# Patient Record
Sex: FEMALE | Race: WHITE | NOT HISPANIC OR LATINO | Employment: PART TIME | URBAN - METROPOLITAN AREA
[De-identification: names, ages, dates, MRNs, and addresses within clinical notes are randomized per-mention and may not be internally consistent; named-entity substitution may affect disease eponyms.]

---

## 2023-07-14 LAB — EXTERNAL RHOGAM GIVEN?: YES

## 2023-08-28 ENCOUNTER — TELEPHONE (OUTPATIENT)
Dept: OBGYN CLINIC | Facility: MEDICAL CENTER | Age: 28
End: 2023-08-28

## 2023-09-07 ENCOUNTER — ROUTINE PRENATAL (OUTPATIENT)
Dept: OBGYN CLINIC | Facility: CLINIC | Age: 28
End: 2023-09-07

## 2023-09-07 VITALS — BODY MASS INDEX: 26.85 KG/M2 | DIASTOLIC BLOOD PRESSURE: 60 MMHG | WEIGHT: 151.6 LBS | SYSTOLIC BLOOD PRESSURE: 110 MMHG

## 2023-09-07 DIAGNOSIS — Z34.03 ENCOUNTER FOR SUPERVISION OF NORMAL FIRST PREGNANCY IN THIRD TRIMESTER: Primary | ICD-10-CM

## 2023-09-07 DIAGNOSIS — Z3A.36 36 WEEKS GESTATION OF PREGNANCY: ICD-10-CM

## 2023-09-07 PROCEDURE — 87150 DNA/RNA AMPLIFIED PROBE: CPT | Performed by: OBSTETRICS & GYNECOLOGY

## 2023-09-07 PROCEDURE — PNV: Performed by: OBSTETRICS & GYNECOLOGY

## 2023-09-07 NOTE — PATIENT INSTRUCTIONS
Fetal Movement   WHAT YOU NEED TO KNOW:   Fetal movements are the kicks, rolls, and hiccups of your unborn baby. You may start to feel these movements when you are 20 weeks pregnant. The movements grow stronger and more frequent as your baby grows. Fetal movements show that your unborn baby is getting the oxygen and nutrients he or she needs before birth. Fewer fetal movements may signal a problem with your baby's health. DISCHARGE INSTRUCTIONS:   Follow up with your doctor or obstetrician as directed:  Write down your questions so you remember to ask them during your visits. Normal fetal movement:  Fetal activity can be described by 4 states, from least to most active. During quiet sleep, your unborn baby may be still for up to 2 hours. During active sleep, he or she kicks, rolls, and moves often. During the quiet awake state, he or she may only move his or her eyes. The active awake state includes strong kicks and rolls. What affects fetal movement:  You may feel your baby move more after you eat, or after you drink caffeine. You may feel your baby move less while you are more active, such as when you exercise. You may also feel fewer movements if you are obese. Certain medicines can change your baby's movements. Tell your healthcare provider about the medicines you are taking. Track fetal movements at home:  Fetal movement is most often felt when you lie quietly on your side. Your healthcare provider may ask you to count movements for 2 hours. He or she may ask you to track how long it takes for your baby to move 10 times. Keep a log of your baby's movements. Contact your doctor or obstetrician if:   It takes longer than usual to feel 8 of your unborn baby's movements. You do not feel your unborn baby move at least 10 times in 2 hours. The skin on your hands, feet, and around your eyes is more swollen than usual.    You have a headache for at least 24 hours.     Tiny red dots appear on your skin.    Your belly is tender when you press on it. You have questions or concerns about your condition or care. Return to the emergency department if:   You do not feel your unborn baby move for 12 hours. You feel cramping or constant pain in your abdomen. You have heavy bleeding from your vagina. You have a severe headache and cannot see clearly. You are having trouble breathing or are vomiting. You have a seizure. © Copyright Darshan Jose Luis 2022 Information is for End User's use only and may not be sold, redistributed or otherwise used for commercial purposes. The above information is an  only. It is not intended as medical advice for individual conditions or treatments. Talk to your doctor, nurse or pharmacist before following any medical regimen to see if it is safe and effective for you. Early Labor Signs   WHAT YOU NEED TO KNOW:   Early labor signs can happen weeks, days, or hours before your baby is ready to be delivered. You may have false labor signs, which are also called Jeremiah Hernadez contractions. False labor is common and may happen several weeks or days before your actual labor. The contractions are not regular, and do not get closer together. The pain is usually mild, does not worsen, and is felt only in front. Stanly Hernadez contractions may happen later in the day, and stop after you change position, walk, or rest.  DISCHARGE INSTRUCTIONS:   Call 911 for any of the following: You have heavy vaginal bleeding. You cannot get to the hospital before the baby starts to come out. Return to the emergency department if:   You have regular, painful contractions that are less than 5 minutes apart and last 30 to 70 seconds each. You have a constant trickle or sudden gush of clear fluid from your vagina. You notice a sudden decrease in your baby's movement.     Contact your obstetrician or healthcare provider if:   You have pain in your lower back or abdomen that does not get better when you change positions. You have bloody mucus or show. You have questions or concerns about your condition or care. Early Labor signs and symptoms:   Lightening  occurs when your baby drops inside your pelvis. You may feel increased pressure in your pelvis. This may happen a few weeks to a few hours before your labor begins. Contractions  are cramps and tightening that occur in your uterus to help move the baby through your birth canal. Contractions occur regularly and more often each time. Each one lasts about 30 to 70 seconds, and gets stronger until you deliver your baby. Contractions do not go away with movement. The pain usually starts in your lower back and moves to your abdomen. Effacement  occurs when your cervix softens and thins, so it can easily open for the baby. You will not be able to feel effacement. Your healthcare provider will examine your cervix for effacement. Dilation  is widening of your cervix. Your healthcare provider will examine your cervix for dilation. Your cervix may start to dilate weeks before your baby is delivered. Your cervix will be fully opened and ready for delivery when it is dilated to 10 centimeters. Increased discharge  from your vagina may occur. It may be brown, pink, clear, or slightly bloody. This discharge may also be called bloody show. Bloody show is a mucus plug that forms and blocks your cervix during pregnancy. The discharge may mean that your cervix is opening up and getting ready for delivery. Rupture of membranes  is a sudden release of clear fluid from your vagina. Ruptured membranes means your water broke. Your healthcare provider may need to break your water if it does not happen on its own. © Copyright Josefina Burns 2022 Information is for End User's use only and may not be sold, redistributed or otherwise used for commercial purposes. The above information is an  only.  It is not intended as medical advice for individual conditions or treatments. Talk to your doctor, nurse or pharmacist before following any medical regimen to see if it is safe and effective for you.

## 2023-09-07 NOTE — PROGRESS NOTES
Routine Prenatal Visit  [facility]  [address], [address], [place], [place]    Assessment/Plan:  Rafael Saab is a 25y.o. year old  at 45w4d who presents for routine prenatal visit. 1. Encounter for supervision of normal first pregnancy in third trimester    2. 36 weeks gestation of pregnancy  -     Strep B DNA probe, amplification          Subjective:     CC: Prenatal care    Dina Crowder is a 25 y.o.  female who presents for routine prenatal care at 36w1d. Pregnancy ROS: no leakage of fluid, pelvic pain, or vaginal bleeding.  good fetal movement. GBS done today    The following portions of the patient's history were reviewed and updated as appropriate: allergies, current medications, past family history, past medical history, obstetric history, gynecologic history, past social history, past surgical history and problem list.      Objective:  /60   Wt 68.8 kg (151 lb 9.6 oz)   LMP 2022 (Exact Date)   BMI 26.85 kg/m²   Pregravid Weight/BMI: 54.4 kg (120 lb) (BMI 21.26)  Current Weight: 68.8 kg (151 lb 9.6 oz)   Total Weight Gain: 14.3 kg (31 lb 9.6 oz)   Pre-Jose Vitals    Flowsheet Row Most Recent Value   Prenatal Assessment    Fetal Heart Rate 146   Movement Present   Prenatal Vitals    Blood Pressure 110/60   Weight - Scale 68.8 kg (151 lb 9.6 oz)   Urine Albumin/Glucose    Dilation/Effacement/Station    Vaginal Drainage    Draining Fluid No   Edema            General: Well appearing, no distress  Respiratory: Unlabored breathing  Cardiovascular: Regular rate. Abdomen: Soft, gravid, nontender  Fundal Height: Appropriate for gestational age. Extremities: Warm and well perfused. Non tender.

## 2023-09-10 LAB — GP B STREP DNA SPEC QL NAA+PROBE: POSITIVE

## 2023-09-12 ENCOUNTER — ROUTINE PRENATAL (OUTPATIENT)
Dept: OBGYN CLINIC | Facility: MEDICAL CENTER | Age: 28
End: 2023-09-12

## 2023-09-12 VITALS — SYSTOLIC BLOOD PRESSURE: 114 MMHG | DIASTOLIC BLOOD PRESSURE: 76 MMHG | WEIGHT: 152.4 LBS | BODY MASS INDEX: 27 KG/M2

## 2023-09-12 DIAGNOSIS — Z34.93 THIRD TRIMESTER PREGNANCY: Primary | ICD-10-CM

## 2023-09-12 DIAGNOSIS — Z3A.36 36 WEEKS GESTATION OF PREGNANCY: ICD-10-CM

## 2023-09-12 DIAGNOSIS — O26.893 RH NEGATIVE STATE IN ANTEPARTUM PERIOD, THIRD TRIMESTER: ICD-10-CM

## 2023-09-12 DIAGNOSIS — Z67.91 RH NEGATIVE STATE IN ANTEPARTUM PERIOD, THIRD TRIMESTER: ICD-10-CM

## 2023-09-12 PROCEDURE — PNV: Performed by: OBSTETRICS & GYNECOLOGY

## 2023-09-12 NOTE — PROGRESS NOTES
Assessment  25 y.o.  at 40w7d presenting for routine prenatal visit. Plan  Diagnoses and all orders for this visit:    Third trimester pregnancy  36 weeks gestation of pregnancy  - PTL precautions  - FKC  - Discussed eIOL vs expectant. Will consider  - Return in 1wk for PN    GBS positive  - Prophylaxis in labor    Rh negative state in antepartum period, third trimester  - s/p rhogam      ____________________________________________________________        Subjective    Dustin Anand is a 25 y.o.  at 40w7d who presents for routine prenatal visit. She is without complaint. She has some cramping intermittently, always resolves. Denies regular contractions, loss of fluid, or vaginal bleeding. She feels regular fetal movements. Pregnancy Problems:  Patient Active Problem List   Diagnosis   • Rh negative state in antepartum period, third trimester   • 36 weeks gestation of pregnancy         Objective  /76   Wt 69.1 kg (152 lb 6.4 oz)   LMP 2022 (Exact Date)   BMI 27.00 kg/m²     FHT: 146 BPM   Uterine Size: 36 cm   Presentations: cephalic   Pelvic Exam:     Dilation: 1cm    Effacement: 60%    Station:  -2    Consistency: medium    Position: posterior     Physical Exam:  Physical Exam  Constitutional:       General: She is not in acute distress. Appearance: Normal appearance. She is well-developed. She is not ill-appearing, toxic-appearing or diaphoretic. HENT:      Head: Normocephalic and atraumatic. Eyes:      General: No scleral icterus. Right eye: No discharge. Left eye: No discharge. Conjunctiva/sclera: Conjunctivae normal.   Pulmonary:      Effort: Pulmonary effort is normal. No accessory muscle usage or respiratory distress. Abdominal:      General: There is distension (gravid). Tenderness: There is no abdominal tenderness. There is no guarding or rebound. Skin:     General: Skin is warm and dry. Coloration: Skin is not jaundiced. Findings: No bruising, erythema or rash. Neurological:      Mental Status: She is alert. Psychiatric:         Mood and Affect: Mood normal.         Behavior: Behavior normal.         Thought Content:  Thought content normal.         Judgment: Judgment normal.

## 2023-09-14 PROBLEM — Z3A.38 38 WEEKS GESTATION OF PREGNANCY: Status: ACTIVE | Noted: 2023-04-19

## 2023-09-14 PROBLEM — B95.1 POSITIVE GBS TEST: Status: ACTIVE | Noted: 2023-09-14

## 2023-09-14 NOTE — PROGRESS NOTES
Routine Prenatal Visit  [facility] [address] [place] [address]    Assessment/Plan:  Ramiro Jimenez is a 25y.o. year old  at 43w4d who presents for routine prenatal visit. 1. 38 weeks gestation of pregnancy  Assessment & Plan:  NIPT low risk   Abnormal 1 hour 143  3 hour was normal         2. Rh negative state in antepartum period, third trimester  Assessment & Plan:  Rhogam at 28 weeks  Check pp and give if needed       3. Positive GBS test  Assessment & Plan:  Treat in labor           Subjective:     CC: Prenatal care    Dustin Anand is a 25 y.o.  female who presents for routine prenatal care at 37w1d. Pregnancy ROS: no leakage of fluid, pelvic pain, or vaginal bleeding. yes fetal movement. The following portions of the patient's history were reviewed and updated as appropriate: allergies, current medications, past family history, past medical history, obstetric history, gynecologic history, past social history, past surgical history and problem list.      Objective:  /70   Wt 70.9 kg (156 lb 6.4 oz)   LMP 2022 (Exact Date)   BMI 27.71 kg/m²   Pregravid Weight/BMI: 54.4 kg (120 lb) (BMI 21.26)  Current Weight: 70.9 kg (156 lb 6.4 oz)   Total Weight Gain: 16.5 kg (36 lb 6.4 oz)   Pre-Jose Vitals    Flowsheet Row Most Recent Value   Prenatal Assessment    Fetal Heart Rate 142   Movement Present   Prenatal Vitals    Blood Pressure 118/70   Weight - Scale 70.9 kg (156 lb 6.4 oz)   Urine Albumin/Glucose    Dilation/Effacement/Station    Vaginal Drainage    Draining Fluid No   Edema            General: Well appearing, no distress  Respiratory: Unlabored breathing  Cardiovascular: Regular rate. Abdomen: Soft, gravid, nontender  Fundal Height: Appropriate for gestational age. Extremities: Warm and well perfused. Non tender.

## 2023-09-18 ENCOUNTER — CLINICAL SUPPORT (OUTPATIENT)
Age: 28
End: 2023-09-18

## 2023-09-18 DIAGNOSIS — Z32.2 ENCOUNTER FOR CHILDBIRTH INSTRUCTION: Primary | ICD-10-CM

## 2023-09-19 ENCOUNTER — ROUTINE PRENATAL (OUTPATIENT)
Dept: OBGYN CLINIC | Facility: MEDICAL CENTER | Age: 28
End: 2023-09-19

## 2023-09-19 VITALS — BODY MASS INDEX: 27.71 KG/M2 | WEIGHT: 156.4 LBS | SYSTOLIC BLOOD PRESSURE: 118 MMHG | DIASTOLIC BLOOD PRESSURE: 70 MMHG

## 2023-09-19 DIAGNOSIS — B95.1 POSITIVE GBS TEST: ICD-10-CM

## 2023-09-19 DIAGNOSIS — Z67.91 RH NEGATIVE STATE IN ANTEPARTUM PERIOD, THIRD TRIMESTER: ICD-10-CM

## 2023-09-19 DIAGNOSIS — Z3A.38 38 WEEKS GESTATION OF PREGNANCY: Primary | ICD-10-CM

## 2023-09-19 DIAGNOSIS — O26.893 RH NEGATIVE STATE IN ANTEPARTUM PERIOD, THIRD TRIMESTER: ICD-10-CM

## 2023-09-19 PROCEDURE — PNV: Performed by: OBSTETRICS & GYNECOLOGY

## 2023-09-21 ENCOUNTER — NURSE TRIAGE (OUTPATIENT)
Dept: OTHER | Facility: OTHER | Age: 28
End: 2023-09-21

## 2023-09-21 ENCOUNTER — HOSPITAL ENCOUNTER (INPATIENT)
Facility: HOSPITAL | Age: 28
LOS: 3 days | Discharge: HOME | End: 2023-09-24
Attending: OBSTETRICS & GYNECOLOGY | Admitting: OBSTETRICS & GYNECOLOGY
Payer: COMMERCIAL

## 2023-09-21 ENCOUNTER — TELEPHONE (OUTPATIENT)
Dept: OBGYN CLINIC | Facility: MEDICAL CENTER | Age: 28
End: 2023-09-21

## 2023-09-21 DIAGNOSIS — Z3A.38 38 WEEKS GESTATION OF PREGNANCY: Primary | ICD-10-CM

## 2023-09-21 PROCEDURE — 3E033VJ INTRODUCTION OF OTHER HORMONE INTO PERIPHERAL VEIN, PERCUTANEOUS APPROACH: ICD-10-PCS | Performed by: OBSTETRICS & GYNECOLOGY

## 2023-09-21 PROCEDURE — 86870 RBC ANTIBODY IDENTIFICATION: CPT | Performed by: OBSTETRICS & GYNECOLOGY

## 2023-09-21 PROCEDURE — 86901 BLOOD TYPING SEROLOGIC RH(D): CPT

## 2023-09-21 PROCEDURE — NC001 PR NO CHARGE: Performed by: OBSTETRICS & GYNECOLOGY

## 2023-09-21 PROCEDURE — 4A1HXCZ MONITORING OF PRODUCTS OF CONCEPTION, CARDIAC RATE, EXTERNAL APPROACH: ICD-10-PCS | Performed by: OBSTETRICS & GYNECOLOGY

## 2023-09-21 PROCEDURE — 86900 BLOOD TYPING SEROLOGIC ABO: CPT

## 2023-09-21 PROCEDURE — 85027 COMPLETE CBC AUTOMATED: CPT

## 2023-09-21 PROCEDURE — 86780 TREPONEMA PALLIDUM: CPT

## 2023-09-21 PROCEDURE — 86850 RBC ANTIBODY SCREEN: CPT

## 2023-09-21 PROCEDURE — 99202 OFFICE O/P NEW SF 15 MIN: CPT

## 2023-09-21 RX ORDER — SODIUM CHLORIDE, SODIUM LACTATE, POTASSIUM CHLORIDE, CALCIUM CHLORIDE 600; 310; 30; 20 MG/100ML; MG/100ML; MG/100ML; MG/100ML
125 INJECTION, SOLUTION INTRAVENOUS CONTINUOUS
Status: DISCONTINUED | OUTPATIENT
Start: 2023-09-21 | End: 2023-09-22

## 2023-09-21 RX ORDER — ONDANSETRON 2 MG/ML
4 INJECTION INTRAMUSCULAR; INTRAVENOUS EVERY 6 HOURS PRN
Status: DISCONTINUED | OUTPATIENT
Start: 2023-09-21 | End: 2023-09-22

## 2023-09-21 RX ORDER — BUPIVACAINE HYDROCHLORIDE 2.5 MG/ML
30 INJECTION, SOLUTION EPIDURAL; INFILTRATION; INTRACAUDAL ONCE AS NEEDED
Status: DISCONTINUED | OUTPATIENT
Start: 2023-09-21 | End: 2023-09-22

## 2023-09-21 RX ORDER — OXYTOCIN/RINGER'S LACTATE 30/500 ML
1-30 PLASTIC BAG, INJECTION (ML) INTRAVENOUS
Status: DISCONTINUED | OUTPATIENT
Start: 2023-09-22 | End: 2023-09-22

## 2023-09-21 RX ADMIN — SODIUM CHLORIDE 5 MILLION UNITS: 0.9 INJECTION, SOLUTION INTRAVENOUS at 23:51

## 2023-09-21 RX ADMIN — SODIUM CHLORIDE, SODIUM LACTATE, POTASSIUM CHLORIDE, AND CALCIUM CHLORIDE 125 ML/HR: .6; .31; .03; .02 INJECTION, SOLUTION INTRAVENOUS at 23:51

## 2023-09-21 NOTE — TELEPHONE ENCOUNTER
Pt called lost her plug last night. Had membrane sweep on tuesday. Has contractions every 10-15 minutes. She did noticed some spotting since last night. Baby is active and moving. Spoke to provider on call. Pt was advised to continue monitoring the bleeding. If bleeding picks up patient was advised to call us back.

## 2023-09-22 ENCOUNTER — ANESTHESIA (INPATIENT)
Dept: ANESTHESIOLOGY | Facility: HOSPITAL | Age: 28
End: 2023-09-22
Payer: COMMERCIAL

## 2023-09-22 ENCOUNTER — ANESTHESIA EVENT (INPATIENT)
Dept: ANESTHESIOLOGY | Facility: HOSPITAL | Age: 28
End: 2023-09-22
Payer: COMMERCIAL

## 2023-09-22 LAB
ABO GROUP BLD: NORMAL
BASE EXCESS BLDCOA CALC-SCNC: -4.5 MMOL/L (ref 3–11)
BASE EXCESS BLDCOV CALC-SCNC: -4 MMOL/L (ref 1–9)
BLD GP AB SCN SERPL QL: POSITIVE
BLOOD GROUP ANTIBODIES SERPL: NORMAL
ERYTHROCYTE [DISTWIDTH] IN BLOOD BY AUTOMATED COUNT: 12.5 % (ref 11.6–15.1)
HCO3 BLDCOA-SCNC: 23.8 MMOL/L (ref 17.3–27.3)
HCO3 BLDCOV-SCNC: 21.1 MMOL/L (ref 12.2–28.6)
HCT VFR BLD AUTO: 36.3 % (ref 34.8–46.1)
HGB BLD-MCNC: 12.2 G/DL (ref 11.5–15.4)
HOLD SPECIMEN: NORMAL
MCH RBC QN AUTO: 29.5 PG (ref 26.8–34.3)
MCHC RBC AUTO-ENTMCNC: 33.6 G/DL (ref 31.4–37.4)
MCV RBC AUTO: 88 FL (ref 82–98)
O2 CT VFR BLDCOA CALC: 11.2 ML/DL
OXYHGB MFR BLDCOA: 46.7 %
OXYHGB MFR BLDCOV: 67 %
PCO2 BLDCOA: 56 MM[HG] (ref 30–60)
PCO2 BLDCOV: 39 MM HG (ref 27–43)
PH BLDCOA: 7.25 [PH] (ref 7.23–7.43)
PH BLDCOV: 7.35 [PH] (ref 7.19–7.49)
PLATELET # BLD AUTO: 193 THOUSANDS/UL (ref 149–390)
PMV BLD AUTO: 11.2 FL (ref 8.9–12.7)
PO2 BLDCOA: 23.2 MM HG (ref 5–25)
PO2 BLDCOV: 27.3 MM HG (ref 15–45)
RBC # BLD AUTO: 4.13 MILLION/UL (ref 3.81–5.12)
RH BLD: NEGATIVE
SAO2 % BLDCOV: 15.4 ML/DL
SPECIMEN EXPIRATION DATE: NORMAL
TREPONEMA PALLIDUM IGG+IGM AB [PRESENCE] IN SERUM OR PLASMA BY IMMUNOASSAY: NORMAL
WBC # BLD AUTO: 9.23 THOUSAND/UL (ref 4.31–10.16)

## 2023-09-22 PROCEDURE — 0KQM0ZZ REPAIR PERINEUM MUSCLE, OPEN APPROACH: ICD-10-PCS | Performed by: OBSTETRICS & GYNECOLOGY

## 2023-09-22 PROCEDURE — 82805 BLOOD GASES W/O2 SATURATION: CPT | Performed by: OBSTETRICS & GYNECOLOGY

## 2023-09-22 PROCEDURE — 4A1HXCZ MONITORING OF PRODUCTS OF CONCEPTION, CARDIAC RATE, EXTERNAL APPROACH: ICD-10-PCS | Performed by: OBSTETRICS & GYNECOLOGY

## 2023-09-22 RX ORDER — DIAPER,BRIEF,INFANT-TODD,DISP
1 EACH MISCELLANEOUS DAILY PRN
Status: DISCONTINUED | OUTPATIENT
Start: 2023-09-22 | End: 2023-09-24 | Stop reason: HOSPADM

## 2023-09-22 RX ORDER — IBUPROFEN 600 MG/1
600 TABLET ORAL EVERY 6 HOURS
Status: DISCONTINUED | OUTPATIENT
Start: 2023-09-22 | End: 2023-09-24 | Stop reason: HOSPADM

## 2023-09-22 RX ORDER — ROPIVACAINE HYDROCHLORIDE 2 MG/ML
INJECTION, SOLUTION EPIDURAL; INFILTRATION; PERINEURAL CONTINUOUS PRN
Status: DISCONTINUED | OUTPATIENT
Start: 2023-09-22 | End: 2023-09-22 | Stop reason: HOSPADM

## 2023-09-22 RX ORDER — CALCIUM CARBONATE 500 MG/1
1000 TABLET, CHEWABLE ORAL DAILY PRN
Status: DISCONTINUED | OUTPATIENT
Start: 2023-09-22 | End: 2023-09-24 | Stop reason: HOSPADM

## 2023-09-22 RX ORDER — ONDANSETRON 2 MG/ML
4 INJECTION INTRAMUSCULAR; INTRAVENOUS EVERY 8 HOURS PRN
Status: DISCONTINUED | OUTPATIENT
Start: 2023-09-22 | End: 2023-09-24 | Stop reason: HOSPADM

## 2023-09-22 RX ORDER — ACETAMINOPHEN 325 MG/1
650 TABLET ORAL EVERY 6 HOURS PRN
Status: DISCONTINUED | OUTPATIENT
Start: 2023-09-22 | End: 2023-09-24 | Stop reason: HOSPADM

## 2023-09-22 RX ORDER — LIDOCAINE HYDROCHLORIDE AND EPINEPHRINE 15; 5 MG/ML; UG/ML
INJECTION, SOLUTION EPIDURAL
Status: COMPLETED | OUTPATIENT
Start: 2023-09-22 | End: 2023-09-22

## 2023-09-22 RX ORDER — OXYTOCIN/RINGER'S LACTATE 30/500 ML
250 PLASTIC BAG, INJECTION (ML) INTRAVENOUS ONCE
Status: DISCONTINUED | OUTPATIENT
Start: 2023-09-22 | End: 2023-09-24 | Stop reason: HOSPADM

## 2023-09-22 RX ORDER — SIMETHICONE 80 MG
80 TABLET,CHEWABLE ORAL 4 TIMES DAILY PRN
Status: DISCONTINUED | OUTPATIENT
Start: 2023-09-22 | End: 2023-09-24 | Stop reason: HOSPADM

## 2023-09-22 RX ADMIN — SODIUM CHLORIDE 2.5 MILLION UNITS: 9 INJECTION, SOLUTION INTRAVENOUS at 04:19

## 2023-09-22 RX ADMIN — ROPIVACAINE HYDROCHLORIDE 10 ML/HR: 2 INJECTION EPIDURAL; INFILTRATION; PERINEURAL at 04:04

## 2023-09-22 RX ADMIN — SODIUM CHLORIDE, SODIUM LACTATE, POTASSIUM CHLORIDE, AND CALCIUM CHLORIDE 999 ML/HR: .6; .31; .03; .02 INJECTION, SOLUTION INTRAVENOUS at 10:37

## 2023-09-22 RX ADMIN — SODIUM CHLORIDE 2.5 MILLION UNITS: 9 INJECTION, SOLUTION INTRAVENOUS at 12:06

## 2023-09-22 RX ADMIN — SODIUM CHLORIDE, SODIUM LACTATE, POTASSIUM CHLORIDE, AND CALCIUM CHLORIDE 125 ML/HR: .6; .31; .03; .02 INJECTION, SOLUTION INTRAVENOUS at 05:49

## 2023-09-22 RX ADMIN — Medication 2 MILLI-UNITS/MIN: at 00:30

## 2023-09-22 RX ADMIN — ROPIVACAINE HYDROCHLORIDE: 2 INJECTION, SOLUTION EPIDURAL; INFILTRATION at 12:43

## 2023-09-22 RX ADMIN — LIDOCAINE HYDROCHLORIDE AND EPINEPHRINE 5 ML: 15; 5 INJECTION, SOLUTION EPIDURAL at 04:00

## 2023-09-22 RX ADMIN — IBUPROFEN 600 MG: 600 TABLET ORAL at 23:43

## 2023-09-22 RX ADMIN — IBUPROFEN 600 MG: 600 TABLET ORAL at 18:03

## 2023-09-22 RX ADMIN — BENZOCAINE AND LEVOMENTHOL 1 APPLICATION: 200; 5 SPRAY TOPICAL at 18:03

## 2023-09-22 RX ADMIN — SODIUM CHLORIDE 2.5 MILLION UNITS: 9 INJECTION, SOLUTION INTRAVENOUS at 07:56

## 2023-09-22 RX ADMIN — SODIUM CHLORIDE, SODIUM LACTATE, POTASSIUM CHLORIDE, AND CALCIUM CHLORIDE 125 ML/HR: .6; .31; .03; .02 INJECTION, SOLUTION INTRAVENOUS at 15:54

## 2023-09-22 RX ADMIN — SODIUM CHLORIDE, SODIUM LACTATE, POTASSIUM CHLORIDE, AND CALCIUM CHLORIDE 999 ML/HR: .6; .31; .03; .02 INJECTION, SOLUTION INTRAVENOUS at 11:38

## 2023-09-22 RX ADMIN — ONDANSETRON 4 MG: 2 INJECTION INTRAMUSCULAR; INTRAVENOUS at 13:53

## 2023-09-22 NOTE — ANESTHESIA POSTPROCEDURE EVALUATION
Post-Op Assessment Note    CV Status:  Stable  Pain Score: 1    Pain management: adequate     Mental Status:  Alert and awake   Hydration Status:  Euvolemic   PONV Controlled:  Controlled   Airway Patency:  Patent      Post Op Vitals Reviewed: Yes      Staff: Anesthesiologist     Post-op block assessment: no complications and catheter intact      No notable events documented.     BP      Temp      Pulse     Resp      SpO2

## 2023-09-22 NOTE — OB LABOR/OXYTOCIN SAFETY PROGRESS
Oxytocin Safety Progress Check Note - Yahir Lopez 25 y.o. female MRN: [id number]    Unit/Bed#: L&D 322-01 Encounter: [id number]    Dose (mark-units/min) Oxytocin: 6 mark-units/min  Contraction Frequency (minutes): 3-6  Contraction Quality: Strong      Cervical Dilation: 3        Cervical Effacement: 90  Fetal Station: -2  Baseline Rate: 130 bpm  Fetal Heart Rate: 140 BPM  FHR Category: 1               Vital Signs:   Vitals:    09/22/23 0558   BP: 104/65   Pulse: 69   Resp:    Temp:        Notes/comments:   FHT with deep deceleration to 80s after chew placement. Resolved with repositioning. SVE as above.     Gillian Spaulding MD 9/22/2023 6:10 AM

## 2023-09-22 NOTE — PLAN OF CARE
Problem: Knowledge Deficit  Goal: Verbalizes understanding of labor plan  Description: Assess patient/family/caregiver's baseline knowledge level and ability to understand information. Provide education via patient/family/caregiver's preferred learning method at appropriate level of understanding. 1. Provide teaching at level of understanding. 2. Provide teaching via preferred learning method(s). Outcome: Progressing     Problem: Labor & Delivery  Goal: Manages discomfort  Description: Assess and monitor for signs and symptoms of discomfort. Assess patient's pain level regularly and per hospital policy. Administer medications as ordered. Support use of nonpharmacological methods to help control pain such as distraction, imagery, relaxation, and application of heat and cold. Collaborate with interdisciplinary team and patient to determine appropriate pain management plan. 1. Include patient in decisions related to comfort. 2. Offer non-pharmacological pain management interventions. 3. Report ineffective pain management to physician. Outcome: Progressing  Goal: Patient vital signs are stable  Description: 1. Assess vital signs - vaginal delivery. Outcome: Progressing     Problem: Knowledge Deficit  Goal: Verbalizes understanding of labor plan  Description: Assess patient/family/caregiver's baseline knowledge level and ability to understand information. Provide education via patient/family/caregiver's preferred learning method at appropriate level of understanding. 1. Provide teaching at level of understanding. 2. Provide teaching via preferred learning method(s). Outcome: Progressing     Problem: Labor & Delivery  Goal: Manages discomfort  Description: Assess and monitor for signs and symptoms of discomfort. Assess patient's pain level regularly and per hospital policy. Administer medications as ordered.  Support use of nonpharmacological methods to help control pain such as distraction, imagery, relaxation, and application of heat and cold. Collaborate with interdisciplinary team and patient to determine appropriate pain management plan. 1. Include patient in decisions related to comfort. 2. Offer non-pharmacological pain management interventions. 3. Report ineffective pain management to physician. Outcome: Progressing  Goal: Patient vital signs are stable  Description: 1. Assess vital signs - vaginal delivery.   Outcome: Progressing

## 2023-09-22 NOTE — OB LABOR/OXYTOCIN SAFETY PROGRESS
Oxytocin Safety Progress Check Note - Dina Crowder 25 y.o. female MRN: [id number]    Unit/Bed#: L&D 322-01 Encounter: [id number]    Dose (mark-units/min) Oxytocin: 4 mark-units/min  Contraction Frequency (minutes): 1.5-2  Contraction Quality: Strong  Tachysystole: No   Cervical Dilation: 10  Dilation Complete Date: 09/22/23  Dilation Complete Time: 1459  Cervical Effacement: 100  Fetal Station: 2  Baseline Rate: 135 bpm  Fetal Heart Rate: 140 BPM  FHR Category: 1               Vital Signs:   Vitals:    09/22/23 1443   BP: 124/74   Pulse: 100   Resp:    Temp:    SpO2:        Notes/comments: Patient complete. Will begin pushing. Category I tracing. Dr. Danilo Dominguez aware.         Faith Roblero MD 9/22/2023 3:01 PM

## 2023-09-22 NOTE — ANESTHESIA PREPROCEDURE EVALUATION
Procedure:  LABOR ANALGESIA    Relevant Problems   GYN   (+) 38 weeks gestation of pregnancy        Physical Exam    Airway    Mallampati score: II  TM Distance: >3 FB  Neck ROM: full     Dental       Cardiovascular  Rhythm: regular, Rate: normal, Cardiovascular exam normal    Pulmonary  Pulmonary exam normal Breath sounds clear to auscultation,     Other Findings        Anesthesia Plan  ASA Score- 2     Anesthesia Type- epidural with ASA Monitors. Additional Monitors:   Airway Plan:           Plan Factors-Exercise tolerance (METS): >4 METS. Chart reviewed. Existing labs reviewed. Patient is not a current smoker. Obstructive sleep apnea risk education given perioperatively. Induction-     Postoperative Plan-     Informed Consent- Anesthetic plan and risks discussed with patient.

## 2023-09-22 NOTE — PLAN OF CARE
Problem: Knowledge Deficit  Goal: Verbalizes understanding of labor plan  Description: Assess patient/family/caregiver's baseline knowledge level and ability to understand information. Provide education via patient/family/caregiver's preferred learning method at appropriate level of understanding. 1. Provide teaching at level of understanding. 2. Provide teaching via preferred learning method(s). 9/22/2023 1819 by Moises Walter RN  Outcome: Completed  9/22/2023 0952 by Moises Walter RN  Outcome: Progressing     Problem: Labor & Delivery  Goal: Manages discomfort  Description: Assess and monitor for signs and symptoms of discomfort. Assess patient's pain level regularly and per hospital policy. Administer medications as ordered. Support use of nonpharmacological methods to help control pain such as distraction, imagery, relaxation, and application of heat and cold. Collaborate with interdisciplinary team and patient to determine appropriate pain management plan. 1. Include patient in decisions related to comfort. 2. Offer non-pharmacological pain management interventions. 3. Report ineffective pain management to physician.  9/22/2023 1819 by Moises Walter RN  Outcome: Completed  9/22/2023 0952 by Moises Walter RN  Outcome: Progressing  Goal: Patient vital signs are stable  Description: 1. Assess vital signs - vaginal delivery.   9/22/2023 1819 by Moises Walter RN  Outcome: Completed  9/22/2023 0952 by Moises Walter RN  Outcome: Progressing

## 2023-09-22 NOTE — OB LABOR/OXYTOCIN SAFETY PROGRESS
Oxytocin Safety Progress Check Note - Audrey Browne 25 y.o. female MRN: [id number]    Unit/Bed#: L&D 322-01 Encounter: [id number]    Dose (mark-units/min) Oxytocin: 2 mark-units/min  Contraction Frequency (minutes): irregular  Contraction Quality: Mild      Cervical Dilation: 2-3        Cervical Effacement: 90  Fetal Station: -2  Baseline Rate: 150 bpm  Fetal Heart Rate: 140 BPM  FHR Category: cat 1                Vital Signs:   Vitals:    09/22/23 0218   BP: 116/73   Pulse: 71   Resp:    Temp:        Notes/comments:       Increasingly more uncomfortable desires epidural   At time of exam palpable forebag will rupture after more comfortable with epidural   Yuri Olguin MD 9/22/2023 3:56 AM

## 2023-09-22 NOTE — PLAN OF CARE
Problem: Knowledge Deficit  Goal: Verbalizes understanding of labor plan  Description: Assess patient/family/caregiver's baseline knowledge level and ability to understand information. Provide education via patient/family/caregiver's preferred learning method at appropriate level of understanding. 1. Provide teaching at level of understanding. 2. Provide teaching via preferred learning method(s). 9/22/2023 1819 by Jennifer Cox RN  Outcome: Completed  9/22/2023 0952 by Jennifer Cox RN  Outcome: Progressing     Problem: Labor & Delivery  Goal: Manages discomfort  Description: Assess and monitor for signs and symptoms of discomfort. Assess patient's pain level regularly and per hospital policy. Administer medications as ordered. Support use of nonpharmacological methods to help control pain such as distraction, imagery, relaxation, and application of heat and cold. Collaborate with interdisciplinary team and patient to determine appropriate pain management plan. 1. Include patient in decisions related to comfort. 2. Offer non-pharmacological pain management interventions. 3. Report ineffective pain management to physician.  9/22/2023 1819 by Jennifer Cox RN  Outcome: Completed  9/22/2023 0952 by Jennifer Cox RN  Outcome: Progressing  Goal: Patient vital signs are stable  Description: 1. Assess vital signs - vaginal delivery.   9/22/2023 1819 by Jennifer Cox RN  Outcome: Completed  9/22/2023 0952 by Jennifer Cox RN  Outcome: Progressing

## 2023-09-22 NOTE — ASSESSMENT & PLAN NOTE
Recovering well   Encourage Ambulation  Encourage breastfeeding  GBS pos   Rh neg Rhogham ordered, baby also O neg

## 2023-09-22 NOTE — OB LABOR/OXYTOCIN SAFETY PROGRESS
Oxytocin Safety Progress Check Note - Lois Heading 25 y.o. female MRN: [id number]    Unit/Bed#: L&D 322-01 Encounter: [id number]    Dose (mark-units/min) Oxytocin: 4 mark-units/min  Contraction Frequency (minutes): 1.5-3  Contraction Quality: Strong  Tachysystole: No   Cervical Dilation: Lip/rim (Comment)        Cervical Effacement: 90  Fetal Station: 1  Baseline Rate: 130 bpm  Fetal Heart Rate: 140 BPM  FHR Category: 1               Vital Signs:   Vitals:    09/22/23 1228   BP: 113/65   Pulse: 85   Resp:    Temp:    SpO2:        Notes/comments: SVE as above. Small lip left but baby has come down a little. Category I tracing. D/w Dr. Real Schafer.         Lieutenant Daniella MD 9/22/2023 1:06 PM

## 2023-09-22 NOTE — TELEPHONE ENCOUNTER
Regardin wks pregnant / contractions / fluids  ----- Message from Sonia Flores sent at 2023  9:40 PM EDT -----  "I'm  38 weeks pregnant and I'm having contractions about every 7 mins but every time I have one a gush of fluid comes out"

## 2023-09-22 NOTE — H&P
[facility]: [id number]  Unit/Bed#: L&D 322-01 Encounter: [id number]    Assessment: 25 y.o.  at 38w1d admitted for premature ruptue of membranes. SVE: 1.5/70/-2  FHT: reactive  Clinical EFW: 22% ; Cephalic confirmed by JOSE RAMON  GBS status: pos, PCN ordered     Plan:   Positive GBS test  Assessment & Plan  PCN ordered    Rh negative state in antepartum period, third trimester  Assessment & Plan  Rhogam postpartum    * 38 weeks gestation of pregnancy  Assessment & Plan  Admit to OBGYN   Clear liquid diet   F/u T&S, CBC, RPR   IVF LR 125cc/hr   Continuous fetal monitoring and tocometry   Analgesia at maternal request   Vertex by TAUS        Discussed case and plan w/ Dr. Dexter Jackson      Chief Complaint: contractions and leaking fluid    HPI: Dina Crowder is a 25 y.o.  with an LAINA of 10/4/2023, by Last Menstrual Period at 38w1d who is being admitted for premature ruptue of membranes. She complains of uterine contractions, occurring every few minutes, has moderate LOF, and reports no VB. She states she has felt good FM. Patient Active Problem List   Diagnosis   • Rh negative state in antepartum period, third trimester   • 38 weeks gestation of pregnancy   • Positive GBS test       Baby complications/comments: none    Review of Systems   Constitutional: Negative for chills and fever. Eyes: Negative for visual disturbance. Respiratory: Negative for shortness of breath. Cardiovascular: Negative for chest pain. Gastrointestinal: Positive for abdominal pain. Negative for diarrhea, nausea and vomiting. Genitourinary: Positive for vaginal discharge. Negative for dysuria, flank pain, hematuria and vaginal bleeding. Skin: Negative for rash. Neurological: Negative for dizziness, numbness and headaches. All other systems reviewed and are negative.       OB Hx:  OB History    Para Term  AB Living   1             SAB IAB Ectopic Multiple Live Births                  # Outcome Date GA Lbr Jonatan/2nd Weight Sex Delivery Anes PTL Lv   1 Current                Past Medical Hx:  No past medical history on file. Past Surgical hx:  No past surgical history on file. Social Hx:  Alcohol use: Denies  Tobacco use: Denies  Other substance use: Denies    No Known Allergies    Medications Prior to Admission   Medication   • Docusate Sodium (COLACE PO)   • melatonin 1 mg   • Prenatal MV-Min-Fe Fum-FA-DHA (PRENATAL 1 PO)       Objective:  Temp:  [98.2 °F (36.8 °C)-98.5 °F (36.9 °C)] 98.4 °F (36.9 °C)  HR:  [] 74  Resp:  [18] 18  BP: ()/(59-88) 99/60  Body mass index is 27.46 kg/m². Physical Exam:  Physical Exam  Constitutional:       General: She is not in acute distress. Appearance: Normal appearance. HENT:      Head: Normocephalic and atraumatic. Eyes:      Extraocular Movements: Extraocular movements intact. Cardiovascular:      Rate and Rhythm: Normal rate. Pulses: Normal pulses. Pulmonary:      Effort: Pulmonary effort is normal. No respiratory distress. Abdominal:      Tenderness: There is no abdominal tenderness. There is no guarding. Musculoskeletal:         General: Normal range of motion. Cervical back: Normal range of motion. Neurological:      General: No focal deficit present. Mental Status: She is alert. Mental status is at baseline. Skin:     General: Skin is warm and dry. Psychiatric:         Mood and Affect: Mood normal.         Behavior: Behavior normal.   Vitals reviewed.             Lab Results   Component Value Date    WBC 9.23 09/21/2023    HGB 12.2 09/21/2023    HCT 36.3 09/21/2023     09/21/2023     No results found for: "NA", "K", "CL", "CO2", "BUN", "CREATININE", "GLUCOSE", "AST", "ALT"  Prenatal Labs: Reviewed     Blood type: O-  Antibody: Negative  GBS: Positive  HIV: Nonreactive  Rubella: Immune  Syphilis IgM/IgG: Nonreactive  HBsAg: Nonreactive  HCAb: Nonreactive  Chlamydia: Negative  Gonorrhea: Negative  Diabetes 1 hour screen: Elevated  3 hour glucose: within normal limits  Platelets: 308  Hgb: 12.2  >2 Midnights  INPATIENT     Signature/Title: Les Rachel MD  Date: 9/22/2023  Time: 6:31 AM

## 2023-09-22 NOTE — TELEPHONE ENCOUNTER
Reason for Disposition  • Leakage of fluid from vagina    Answer Assessment - Initial Assessment Questions  1. ONSET: "When did the symptoms begin?"         7 hours of contraction  2. CONTRACTIONS: "Are you having any contractions?" If yes, ask: "Describe the contractions that you are having." (e.g., duration, frequency, regularity, severity)      Contraction every 7 minutes. 3. LAINA: "What date are you expecting to deliver?"      10/4/2023  4. PARITY: "Have you had a baby before?" If Yes, ask: "How long did the labor last?"      First  5. FETAL MOVEMENT: "Has the baby's movement decreased or changed significantly from normal?"      Moving well  6. OTHER SYMPTOMS: "Do you have any other symptoms?" (e.g., abdominal pain, vaginal bleeding, fever, hand/facial swelling)      Gush of fluid 12 noon with more fluids coming on and off three times an hour with contractions she gets a gush of fluid.     Protocols used: PREGNANCY - RUPTURE OF [facility]

## 2023-09-22 NOTE — OB LABOR/OXYTOCIN SAFETY PROGRESS
Oxytocin Safety Progress Check Note - Connie Rider 25 y.o. female MRN: [id number]    Unit/Bed#: L&D 322-01 Encounter: [id number]    Dose (mark-units/min) Oxytocin: 4 mark-units/min  Contraction Frequency (minutes): 1.5-3  Contraction Quality: Strong  Tachysystole: No   Cervical Dilation: Lip/rim (Comment)        Cervical Effacement: 90  Fetal Station: 0  Baseline Rate: 140 bpm  Fetal Heart Rate: 140 BPM  FHR Category: I               Vital Signs:   Vitals:    09/22/23 1100   BP:    Pulse: 85   Resp: 16   Temp: 98.4 °F (36.9 °C)   SpO2: 100%       Notes/comments:   Patient resting comfortably in bed in high Fowlers position. Reports feeling more pressure. SVE as above, cervical lip more prominent on maternal left. Dr. Unruly Phillips aware.      Margaret Klein MD 9/22/2023 12:05 PM

## 2023-09-22 NOTE — L&D DELIVERY NOTE
Vaginal Delivery Summary - OB/GYN   Anali Cortes 25 y.o. female MRN: [id number]  Unit/Bed#: L&D 322-01 Encounter: [id number]    Pre-delivery Diagnosis:   Pregnancy at 45 weeks gestation   Rh negative  GBS positive    Post-delivery Diagnosis: same, delivered    Procedure: Spontaneous Vaginal Delivery    Attending Physician: Dr. Onofre Guerrero  Resident Physician: Dr. Mega Issa  Other Assistant: Dr. Aba Subramanian    Anesthesia: Epidural    QBL: [id number]    Complications: none apparent    Specimens:   1. Arterial and venous cord gases  2. Cord blood  3. Segment of umbilical cord  4. Placenta to storage     Findings:  1. Viable female on 2023 at 1633, with APGARS of 8 and 9 at 1 and 5 minutes respectively. Weight pending at time of dictation for skin to skin bonding. 2. Spontaneous delivery of intact placenta at 1637  3. 2 degree laceration repaired with 3-0 Vicryl Rapide  4. Bilateral labial lacerations. Left repaired with 3-0 vicryl rapide    Gases:  Umbilical Artery  Recent Labs     23  1634   PHCART 7.246   BECART -4.5*       Umbilical Vein  Recent Labs     23  1634   PHCVEN 7.351   BECVEN -4.0*         Brief history and labor course:  Anali Cortes is now a 25 y.o.  at 38w2d. She presented to labor and delivery for premature rupture of membranes and on exam was found to be 1.5/70/-2. Pitocin was started and she received her epidural.  She progressed to complete cervical dilation and began pushing. Description of delivery:    Patient delivered a viable female , wt pending as mother is doing skin to skin bonding. The fetal vertex delivered KYAW position spontaneously. There was no nuchal cord. The anterior shoulder delivered atraumatically with maternal expulsive forces and the assistance of gentle downward traction. The posterior shoulder delivered with maternal expulsive forces and the assistance of gentle upward traction. The remainder of the fetus delivered spontaneously.      Upon delivery, the infant was placed on the mothers abdomen and the cord was doubly clamped and cut. Delayed cord clamping was achieved. The infant was noted to cry spontaneously and was moving all extremities appropriately. There was no evidence for injury. Nurse resuscitators evaluating the . Arterial and venous cord blood gases and cord blood was collected for analysis. These were promptly sent to the lab. In the immediate post-partum, active management of the 3rd stage of labor was performed with massage, the administration of 30 units of IV pitocin, and gentle traction on the umbilical cord. The placenta delivered spontaneously and was noted to have a centrally inserted 3 vessel cord. The placenta was sent to storage. The vagina, cervix, perineum, and rectum were inspected and there was noted to be a 2nd degree perineal laceration and bilateral perineal abrasions. Laceration Repair    The patient was comfortable with epidural for analgesia. Laceration was repaired with 3-0 Vicryl rapide to reapproximate the 2nd degree laceration. A figure of 8 stitch was used to repair the right labial abrasion. Good hemostasis was confirmed at the conclusion of this procedure. Bimanual exam revealed minimal clots and good uterine tone. The fundus was firm and at the level of the umbilicus. At the conclusion of the procedure, all needle, sponge, and instrument counts were noted to be correct. Patient tolerated the procedure well and was allowed to recover in labor and delivery room with family and  before being transferred to the post-partum floor. Dr. Miguel Angel Prado was present and participated in all key portions of the case.     Disposition:  The patient and the  both tolerated the procedure well and are recovering in labor and delivery room       Shakir Haider MD  OB/GYN PGY-1  2023  5:22 PM

## 2023-09-22 NOTE — DISCHARGE SUMMARY
Obstetrics Discharge Summary  Wil Ho 25 y.o. female MRN: [id number]  Unit/Bed#: L&D 322-01 Encounter: [id number]    Admission Date: 2023     Discharge Date: 2023    Admitting Attending: Dr. Robynn Galeazzi  Delivery Attending: Dr. Hamida Lopez  Discharging Attending: Dr. Heather Klein    Admitting Diagnoses:   Pregnancy at 45 weeks gestation   Rh negative  GBS positive    Discharge Diagnoses:   Same, delivered    Procedures: spontaneous vaginal delivery    Anesthesia: epidural    Hospital course:Ana Luisa Richter is now a 25 y.o.  at 38w2d. She presented to labor and delivery for premature rupture of membranes and on exam was found to be 1.5/70/-2. Pitocin was started and she received her epidural.  She progressed to complete cervical dilation and began pushing. On 23 she delivered a viable female  38w2d via normal spontaneous vaginal delivery. She sustained a 2nd degree laceration during delivery which was adequately repaired. 's birth weight was 7lbs 5.3oz; Apgars were 8 (1 min) and 9 (5 min).  was admitted to the  nursery. Patient tolerated the procedure well. Her post-delivery course was uncomplicated. Her postpartum pain was well controlled with oral analgesics. Maternal blood type is O negative so RhoGAM was not indicated as baby's blood type is also O-. On day of discharge, she was ambulating and able to reasonably perform all ADLs. She was voiding and had appropriate bowel function. Pain was well controlled. She was discharged home on postpartum day #2 without complications. Patient was instructed to follow up with her OBGYN as an outpatient and was given appropriate warnings to call provider if she develops signs of infection or uncontrolled pain. Complications: none apparent    Condition at discharge: good     Provisions for Follow-Up Care:  Please see after visit summary for information related to follow-up care and any pertinent home health orders. Disposition: Home    Planned Readmission: No    Discharge Medications:   Please see AVS for a complete list of discharge medications. Discharge instructions :   Please see AVS for complete discharge instructions.     Eduardo Paulino MD  OBGYN PGY-2  9/24/2023 9:34 AM

## 2023-09-22 NOTE — ANESTHESIA PROCEDURE NOTES
Epidural Block    Patient location during procedure: OB/L&D  Start time: 9/22/2023 3:57 AM  Reason for block: procedure for pain  Staffing  Performed by: Sajan Banks DO  Authorized by: Francheska Bahena DO    Preanesthetic Checklist  Completed: patient identified, IV checked, site marked, risks and benefits discussed, surgical consent, monitors and equipment checked, pre-op evaluation and timeout performed  Epidural  Patient position: sitting  Prep: Betadine  Sedation Level: no sedation  Patient monitoring: frequent blood pressure checks, continuous pulse oximetry and heart rate  Approach: midline  Location: lumbar, L3-4  Injection technique: AGUILAR saline  Needle  Needle type: Tuohy   Needle gauge: 18 G  Needle insertion depth: 6 cm  Catheter type: multi-orifice  Catheter size: 20 G  Catheter at skin depth: 8 cm  Catheter securement method: stabilization device, clear occlusive dressing and tape  Test dose: negativelidocaine-epinephrine (XYLOCAINE-MPF/EPINEPHRINE) 1.5 %-1:200,000 injection 3 mL - Epidural   5 mL - 9/22/2023 4:00:00 AM  Assessment  Sensory level: T10  Number of attempts: 1negative aspiration for CSF, negative aspiration for heme and no paresthesia on injection  patient tolerated the procedure well with no immediate complications

## 2023-09-22 NOTE — OB LABOR/OXYTOCIN SAFETY PROGRESS
Oxytocin Safety Progress Check Note - Dnia Crowder 25 y.o. female MRN: [id number]    Unit/Bed#: L&D 322-01 Encounter: [id number]    Dose (mark-units/min) Oxytocin: 6 mark-units/min  Contraction Frequency (minutes): 1.5-2  Contraction Quality: Strong  Tachysystole: No   Cervical Dilation: 9        Cervical Effacement: 90  Fetal Station: 0  Baseline Rate: 140 bpm  Fetal Heart Rate: 140 BPM  FHR Category: II               Vital Signs:   Vitals:    09/22/23 1000   BP: 95/53   Pulse: 91   Resp:    Temp:    SpO2:        Notes/comments:   Patient with variable deceleration to 70 bpm for 60 seconds. Resolved with maternal position change. SVE as above.  Dr. Danilo Dominguez aware    Marisa Dove MD 9/22/2023 10:37 AM

## 2023-09-23 PROCEDURE — 99024 POSTOP FOLLOW-UP VISIT: CPT | Performed by: OBSTETRICS & GYNECOLOGY

## 2023-09-23 RX ADMIN — ACETAMINOPHEN 325MG 650 MG: 325 TABLET ORAL at 15:53

## 2023-09-23 RX ADMIN — IBUPROFEN 600 MG: 600 TABLET ORAL at 06:08

## 2023-09-23 RX ADMIN — IBUPROFEN 600 MG: 600 TABLET ORAL at 12:10

## 2023-09-23 NOTE — PROGRESS NOTES
Progress Note - OB/GYN  Kojo Diop 25 y.o. female MRN: [id number]  Unit/Bed#: L&D 315-01 Encounter: [id number]    Assessment and Plan     Kojo Diop is a patient of: [facility]. She is PPD# 1 s/p  spontaneous vaginal delivery  Recovering well and is stable       Positive GBS test  Assessment & Plan  PCN ordered    Rh negative state in antepartum period, third trimester  Assessment & Plan  Rhogam postpartum    *  (spontaneous vaginal delivery)  Assessment & Plan  Recovering well   Encourage Ambulation  Encourage breastfeeding  GBS pos   Rh neg Rhogham ordered, baby also O neg          Disposition    - Anticipate discharge home on PPD# 1 vs 2      Subjective/Objective     Chief Complaint: Postpartum State     Subjective:    Kojo Diop is PPD#1 s/p  spontaneous vaginal delivery. She has no current complaints. Pain is well controlled. Patient is currently voiding. She is ambulating. Patient is currently passing flatus and has had no bowel movement. She is tolerating PO, and denies nausea or vomitting. Patient denies fever, chills, chest pain, shortness of breath, or calf tenderness. Lochia is normal. She is recovering well and is stable. She desires discharge today if she and baby are both cleared.          Vitals:   /62 (BP Location: Left arm)   Pulse 77   Temp 97.9 °F (36.6 °C) (Oral)   Resp 16   Ht 5' 3" (1.6 m)   Wt 70.3 kg (155 lb)   LMP 2022 (Exact Date)   SpO2 98%   Breastfeeding Yes   BMI 27.46 kg/m²       Intake/Output Summary (Last 24 hours) at 2023 0450  Last data filed at 2023 2350  Gross per 24 hour   Intake --   Output 1597 ml   Net -1597 ml       Invasive Devices     Peripheral Intravenous Line  Duration           Peripheral IV 23 Right Hand 1 day                Physical Exam:   GEN: Kojo Diop appears well, alert and oriented x 3, pleasant and cooperative   CARDIO: RRR, no murmurs or rubs  RESP:  CTAB, no wheezes or rales  ABDOMEN: soft, no tenderness, no distention, fundus @ U-2  EXTREMITIES: non tender, no erythema  Labs:     Hemoglobin   Date Value Ref Range Status   09/21/2023 12.2 11.5 - 15.4 g/dL Final   07/07/2023 11.5 11.5 - 15.4 g/dL Final     WBC   Date Value Ref Range Status   09/21/2023 9.23 4.31 - 10.16 Thousand/uL Final   07/07/2023 8.23 4.31 - 10.16 Thousand/uL Final     Platelets   Date Value Ref Range Status   09/21/2023 193 149 - 390 Thousands/uL Final   07/07/2023 175 149 - 390 Thousands/uL Final          Severiano Inoue, MD  9/23/2023  4:50 AM

## 2023-09-23 NOTE — LACTATION NOTE
This note was copied from a baby's chart. CONSULT - LACTATION  Baby Girl Deborrdarien Penny 1 days female MRN: [id number]    [address] [place] [address] NURSERY Room / Bed: L&D 315(N)/L&D 315(N) Encounter: [id number]    Maternal Information     MOTHER:  Ana Luisa Penny  Maternal Age: 25 y.o.   OB History: # 1 - Date: 23, Sex: Female, Weight: 3325 g (7 lb 5.3 oz), GA: 38w2d, Delivery: Vaginal, Spontaneous, Apgar1: 8, Apgar5: 9, Living: Living, Birth Comments: None   Previouse breast reduction surgery? No    Lactation history:   Has patient previously breast fed: No   How long had patient previously breast fed:     Previous breast feeding complications:     No past surgical history on file. Birth information:  YOB: 2025   Time of birth: 4:33 PM   Sex: female   Delivery type: Vaginal, Spontaneous   Birth Weight: 3325 g (7 lb 5.3 oz)   Percent of Weight Change: 0%     Gestational Age: 43w1d   [unfilled]    Assessment     Breast and nipple assessment: normal assessment     Assessment: normal assessment    Feeding assessment: latch difficulty Josue Livingston struggles to [place] and maintian at latch) at times  LATCH:  Latch: Repeated attempts, hold nipple in mouth, stimulate to suck   Audible Swallowing: None   Type of Nipple: Everted (After stimulation)   Comfort (Breast/Nipple): Soft/non-tender   Hold (Positioning): Partial assist, teach one side, mother does other, staff holds   [facility] Score: 6          Feeding recommendations:  breast feed on demand     Met with mother. Provided mother with Ready, Set, Baby booklet which contained information on:  Hand expression with access to QR codes to review hand expression. Positioning and latch reviewed as well as showing images of other feeding positions. Discussed the properties of a good latch in any position.    Feeding on cue and what that means for recognizing infant's hunger, s/s that baby is getting enough milk and some s/s that breastfeeding dyad may need further help  Skin to Skin contact and benefits to mom and baby  Avoidance of pacifiers for the first month discussed. Gave information on common concerns, what to expect the first few weeks after delivery, preparing for other caregivers, and how partners can help. Resources for support also provided. Met with mother to go over discharge breastfeeding booklet. Reviewed breastfeeding and your lifestyle, storage and preparation of breast milk, how to keep you breast pump clean, the employed breastfeeding mother and paced bottle feeding handouts. Booklet included Breastfeeding Resources for after discharge including access to the number for the where[employer]stand[employer]. Discussed s/s engorgement, blocked milk ducts, and mastitis. Discussed how to remedy at home and when to contact physician. Amanda Cartwright notes some struggling with achieving and maintaining a latch. She states that Springe Jeans will feed from the breast about every other feeding and when she does not latch she will hand express colostrum to Gabrielle's mouth. Worked on positioning infant up at chest level and starting to feed infant with nose arriving at the nipple. Then, using areolar compression to achieve a deep latch that is comfortable and exchanges optimum amounts of milk. I offered suggestions on positioning, for a more optimal latch, showed mom proper positioning, ear, shoulder hip in line, baby's arms open, not in between mom and baby, nose to nipple, hand at base of head/neck. I encouraged Amanda Cartwright to continue to breastfeed and hand express to feed Pearlene Jemartha and to protect milk supply, we discussed using the electric pump also as needed, she declines at this time. I encouraged her to call the baby and me support center for an appt for follow up support. I entouraged her to call for assistance with feedings or with any questions or concerns.   Johanna Escobar RN 9/23/2023 11:30 AM

## 2023-09-24 VITALS
OXYGEN SATURATION: 98 % | SYSTOLIC BLOOD PRESSURE: 113 MMHG | HEIGHT: 63 IN | RESPIRATION RATE: 18 BRPM | BODY MASS INDEX: 27.46 KG/M2 | WEIGHT: 155 LBS | HEART RATE: 62 BPM | DIASTOLIC BLOOD PRESSURE: 67 MMHG | TEMPERATURE: 97.5 F

## 2023-09-24 PROBLEM — B95.1 POSITIVE GBS TEST: Status: RESOLVED | Noted: 2023-09-14 | Resolved: 2023-09-24

## 2023-09-24 PROCEDURE — NC001 PR NO CHARGE: Performed by: OBSTETRICS & GYNECOLOGY

## 2023-09-24 PROCEDURE — 99024 POSTOP FOLLOW-UP VISIT: CPT | Performed by: OBSTETRICS & GYNECOLOGY

## 2023-09-24 RX ORDER — ACETAMINOPHEN 325 MG/1
650 TABLET ORAL EVERY 6 HOURS PRN
Refills: 0
Start: 2023-09-24

## 2023-09-24 RX ORDER — DIAPER,BRIEF,INFANT-TODD,DISP
1 EACH MISCELLANEOUS DAILY PRN
Refills: 0
Start: 2023-09-24

## 2023-09-24 RX ORDER — IBUPROFEN 600 MG/1
600 TABLET ORAL EVERY 6 HOURS
Qty: 30 TABLET | Refills: 0
Start: 2023-09-24

## 2023-09-24 RX ADMIN — IBUPROFEN 600 MG: 600 TABLET ORAL at 00:03

## 2023-09-24 RX ADMIN — IBUPROFEN 600 MG: 600 TABLET ORAL at 06:29

## 2023-09-24 RX ADMIN — ACETAMINOPHEN 325MG 650 MG: 325 TABLET ORAL at 09:50

## 2023-09-24 RX ADMIN — IBUPROFEN 600 MG: 600 TABLET ORAL at 13:23

## 2023-09-24 NOTE — PLAN OF CARE
Problem: POSTPARTUM  Goal: Experiences normal postpartum course  Description: INTERVENTIONS:  - Monitor maternal vital signs  - Assess uterine involution and lochia  2023 by Vance Torres RN  Outcome: Completed  2023 by Vance Torres RN  Outcome: Progressing  Goal: Appropriate maternal -  bonding  Description: INTERVENTIONS:  - Identify family support  - Assess for appropriate maternal/infant bonding   -Encourage maternal/infant bonding opportunities  - Referral to  or  as needed  2023 by Vance Torres RN  Outcome: Completed  2023 by Vance Torres RN  Outcome: Progressing  Goal: Establishment of infant feeding pattern  Description: INTERVENTIONS:  - Assess breast/bottle feeding  - Refer to lactation as needed  2023 by Vance Torres RN  Outcome: Completed  2023 by Vance Torres RN  Outcome: Progressing  Goal: Incision(s), wounds(s) or drain site(s) healing without S/S of infection  Description: INTERVENTIONS  - Assess and document dressing, incision, wound bed, drain sites and surrounding tissue  - Provide patient and family education  - Perform skin care/dressing changes every   2023 by Vance Torres RN  Outcome: Completed  2023 by Vance Torres RN  Outcome: Progressing

## 2023-09-24 NOTE — PROGRESS NOTES
Progress Note - OB/GYN  Leticia Gayle 25 y.o. female MRN: [id number]  Unit/Bed#: L&D 315-01 Encounter: [id number]    Assessment and Plan     Leticia Gayle is a patient of: [facility]. She is PPD# 2 s/p  spontaneous vaginal delivery  Recovering well and is stable       Positive GBS test  Assessment & Plan  PCN ordered    Rh negative state in antepartum period, third trimester  Assessment & Plan  Rhogam postpartum    *  (spontaneous vaginal delivery)  Assessment & Plan  Recovering well   Encourage Ambulation  Encourage breastfeeding  GBS pos   Rh neg Rhogham ordered, baby also O neg            Disposition    - Anticipate discharge home on PPD# 2      Subjective/Objective     Chief Complaint: Postpartum State     Subjective:    Leticia Gayle is PPD#2 s/p  spontaneous vaginal delivery. She has no current complaints. Pain is well controlled. Patient is currently voiding. She is ambulating. Patient is currently passing flatus and has had no bowel movement. She is tolerating PO, and denies nausea or vomitting. Patient denies fever, chills, chest pain, shortness of breath, or calf tenderness. Lochia is normal. She is recovering well and is stable.        Vitals:   /71 (BP Location: Left arm)   Pulse 69   Temp 97.9 °F (36.6 °C) (Oral)   Resp 16   Ht 5' 3" (1.6 m)   Wt 70.3 kg (155 lb)   LMP 2022 (Exact Date)   SpO2 98%   Breastfeeding Yes   BMI 27.46 kg/m²     No intake or output data in the 24 hours ending 23 0641    Invasive Devices     None                 Physical Exam:   GEN: Leticia Gayle appears well, alert and oriented x 3, pleasant and cooperative   CARDIO: RRR, no murmurs or rubs  RESP:  CTAB, no wheezes or rales  ABDOMEN: soft, no tenderness, no distention, fundus @ U-2  EXTREMITIES: non tender, no erythema  Labs:     Hemoglobin   Date Value Ref Range Status   2023 12.2 11.5 - 15.4 g/dL Final   2023 11.5 11.5 - 15.4 g/dL Final     WBC   Date Value Ref Range Status   09/21/2023 9.23 4.31 - 10.16 Thousand/uL Final   07/07/2023 8.23 4.31 - 10.16 Thousand/uL Final     Platelets   Date Value Ref Range Status   09/21/2023 193 149 - 390 Thousands/uL Final   07/07/2023 175 149 - 390 Thousands/uL Final          Riccardo Gitelman, MD  9/24/2023  6:41 AM

## 2023-09-25 NOTE — UTILIZATION REVIEW
NOTIFICATION OF INPATIENT ADMISSION   MATERNITY/DELIVERY AUTHORIZATION REQUEST   SERVICING FACILITY:   [address] [place] [address] - L&D, [place], NICU  [facility], [address] [place] [address]  Tax ID: [id number]  NPI: [id number] ATTENDING PROVIDER:  Attending Name and NPI#: Linnette Berger Md [[id number]]  Address: [facility], [address] [place] [address]  Phone: [phone]     ADMISSION INFORMATION:  Place of Service: Inpatient [facility] [facility]   Place of Service Code: 21  Inpatient Admission Date/Time: 23 11:19 PM  Discharge Date/Time: 2023  2:46 PM  Admitting Diagnosis Code/Description:  Vaginal discharge during pregnancy in third trimester [O26.893, N89.8]     Mother: Dustin Anand 1995 Estimated Date of Delivery: 10/4/23  Delivering clinician: Linnette Berger    OB History        1    Para   1    Term   1            AB        Living   1       SAB        IAB        Ectopic        Multiple   0    Live Births   1                Name & MRN:   Information for the patient's :  Gerardo Best [[id number]]      Delivery Information:  Sex: female  Delivered 2023 4:33 PM by Vaginal, Spontaneous; Gestational Age: 43w1d     Measurements:  Weight: 7 lb 5.3 oz (3325 g); Height: 20"    APGAR 1 minute 5 minutes 10 minutes   Totals: 8 9      [place] Birth Information: 25 y.o. female MRN: [id number] Unit/Bed#: L&D 315-01   Birthweight: No birth weight on file. Gestational Age: <None> Delivery Type:    APGARS Totals:        UTILIZATION REVIEW CONTACT:  Kennedi Jacobsen Utilization   Network Utilization Review Department  Phone: [phone]  Fax [phone]  Email: [email or web address]  Contact for approvals/pending authorizations, clinical reviews, and discharge.      PHYSICIAN ADVISORY SERVICES:  Medical Necessity Denial & [employer] Review  Phone: [phone]  Fax: [phone]  Email: [email or web address]

## 2023-09-29 ENCOUNTER — OFFICE VISIT (OUTPATIENT)
Dept: POSTPARTUM | Facility: CLINIC | Age: 28
End: 2023-09-29
Payer: COMMERCIAL

## 2023-09-29 PROCEDURE — 99404 PREV MED CNSL INDIV APPRX 60: CPT | Performed by: PEDIATRICS

## 2023-09-29 NOTE — PROGRESS NOTES
INITIAL BREAST FEEDING EVALUATION    Informant/Relationship: Parents, Swathi Medeiros and Willard    Discussion of General Lactation Issues: Had elevated bilirubin in hospital.  She lost 13 percent. Worried about how much she was getting. Started supplementing with 1 oz of formula about 4-5 times per day. When they feed the ounce of supplement,  Swathi Medeiros has been pumping. Pumps one breast at a time and gets about 15ml. She has been using the [facility] pump, but also has a Motif. Breasts have felt heavier since she gave birth. They are concerned about her weight loss and really want to make sure she is getting enough calories. Infant is 9days old today.  History:  Fertility Problem: None  Breast changes: noticed they got bigger and more sensitive  : yes  Full term:yes   labor:no  First nursing/attempt < 1 hour after birth: didn't really nurse. Just mouthed the breast. Didn't suck at all  Skin to skin following delivery: yes  Breast changes after delivery:yes  Rooming in (infant in room with mother with exception of procedures, eg. Circumcision: yes  Blood sugar issues:no  NICU stay:no  Jaundice: yes  Phototherapy: none  Supplement given: (list supplement and method used as well as reason(s): none in the hospital    No past medical history on file. No current outpatient medications on file. No Known Allergies    Social History     Substance and Sexual Activity   Drug Use Not on file       Social History     Interval Breastfeeding History:  When she first latches it's uncomfortable  Frequency of breast feeding: every hour to 1.5 hours  Does mother feel breastfeeding is effective: It doesn't seem like it.  Sometimes she nurses better than at other times  Does infant appear satisfied after nursing: not really  Stooling pattern normal: yes  Urinating frequently: at least 5 per day  Using shield or shells: no    Alternative/Artificial Feedings:   Bottle: Preemie nipple  Cup: no  Syringe/Finger: They were syringe feeding for two days           Formula Type: Enfamil                     Amount: 1 oz            Breast Milk:                      Amount: n/a            Frequency Q  Hr between feedings  Elimination Problems: no      Equipment:  Nipple Shield             Type: n/a             Size: n/a             Frequency of Use: n/a  Pump            Type: Motiff but has been using the [facility] Go (Using a 21mm flange) Has not used the Motiff at all. Frequency of Use: about 4-5 times per day, but only pumps one side. Shells            Type: n/a            Frequency of use: n/a    Equipment Problems: Not sure about the Motiff    Mom:  Breast : small, full looking, widely spaced, very hard on right breast in the outer portion  Nipple Assessment in General: Everted, eraser shape. Very slight damage on the face of both nipples. They looked dry  Mother's Awareness of Feeding Cues                 Recognizes: yes                  Verbalizes: Yes  Support System: FOB  History of Breastfeeding: No  Changes/Stressors/Violence: Lack of sleep  Concerns/Goals: Making sure she's getting enough calories and would like to get off of formula. She would like to exclusively breast feed  Has to go back to work at 12 weeks    Problems with Mom: Concerned about milk production      Infant:  Behaviors: awake and alert at first. Stayed awake at the first breast for a little bit, then fell asleep. Color: still slightly yellow   Birth weight: 7 lbs 5.3  Current weight: 6 lbs 1.9    Problems with infant: ineffective sucking at breast.      Infant assessment: Elizabeth Assessment for Lingual Frenulum Function    Appearance Items Function Items   Appearance of tongue when lifted  2: Round or square   Lateralization  2: Complete   Elasticity of frenulum  1: Moderately elastic   Lift of tongue  2: Tip to mid-mouth     Length of lingual frenulum when tongue lifted  lingual frenulum length: 2: > 1cm     Extension of tongue  2:  Tip over lower lip   Attachment of lingual frenulum to tongue  2: Posterior to tip   Spread of anterior tongue  2: Complete   Attachment of lingual frenulum to inferior alveolar ridge  2: Attached to floor of mouth or well below ridge Cupping  2: Entire edge, firm cup   Ankyloglossia Grading:  Class I: mild, 12-16 mm  Class II: moderate, 8-11 mm  Class III: severe, 3-7 mm  ClassIV: complete, less than 3 mm Peristalsis  2: Complete, anterior to posterior       SCORE:    Appearance: 9 (<8=ankyloglossia)  Function: 14 (<11=ankyloglossia) Snapback  2: None          Latch:  Efficiency:               Lips Flanged: upper lip looks tense and slightly tucked under. Lower lip would sometimes go to a neurtral position but could easily be flanged out              Depth of latch: She doesn't want to open mouth wide. Mom has been pushing her nipple into her mouth. Baby tends to fall back off of the nipple during the feed. Audible Swallow: very few              Visible Milk: yes              Wide Open/ Asymmetrical: asymmetric but not open too wide. We were able to get a few latches that were better              Suck Swallow Cycle: Breathing: not labored, Coordinated: No. Very ineffective  Nipple Assessment after latch: slightly compressed, especially on the right side  Latch Problems: yes. Tends towards a shallow latch. Position:  Infant's Ergonomics/Body               Body Alignment: good               Head Supported: yes               Close to Mom's body/ Lifted/ Supported: yes               Mom's Ergonomics/Body: yes                           Supported: yes                           Sitting Back: yes                           Brings Baby to her breast: yes  Positioning Problems: none      Handouts:   Hand expression  Storing breast milk  Cleaning pump parts          Education:  Reviewed Latch: practiced getting a deeper latch.  Demonstrated how to gently compress breast tissue, aim nipple to nose stroking nipple on top lip, getting chin to touch the underside of the breast and waiting for a wide open mouth. Reviewed Positioning for Dyad: yes  Reviewed Frequency/Supply & Demand: Yes and discussed setting up a regular pumping routine until Gerardo Cox is feeding better. Pumping q 3 hours for 15 minutes and hand expressing for 5-7 minutes. Allowing for sleep at night with a 4-5 hour stretch. Reviewed Infant:Cues and varied States of Awareness: yes  Reviewed Infant Elimination: yes  Reviewed Alternative/Artificial Feedings: Suggested using an SNS with either expressed breast milk or formula or offering a bottle using paced feeding technique. We tried this today using formula. The tubing had to be primed with consistent pushing of formula. Gerardo Cox took a couple of nice deep sucks and swallows but she was not consistent. FOB gave her the rest of the formula with a bottle. The base of the bottle used was too wide for her. Discussed rotating the bottle in her mouth so she is well onto the base. Her top lip appeared very tense on the Motif bottle. Paced feeding was demonstrated and FOB practiced this. Reviewed Mom/Breast care: Continue to use nipple balm  Reviewed Equipment: Yes. Thoroughly reviewed the Motif pump and it's settings. Start on massage mode and switch to expression mode once milk starts to come out. Pump for 15 minutes and hand express for 5-7 minutes      Plan:  Parents were given a lot of support and encouragement today and reassurance they will be able to meet their goals. We have a solid plan for the next week. She will continue to put Gerardo Bright to the breast using the asymmetric latch technique we practiced today. Sheila George will pump every three hours for 15 minutes and hand express for about 5 after pumping. This will help to increase her production. She will use the Motif, double electric pump and will get a 21 mm flange which will fit her better than the 24 mm.     She will take one 4-5 hour break from pumping and was encouraged to sleep during this time. An SNS was set up for them and they have the supplies to do this at home. Recommended they increase the amount of expressed breast milk or formula to at least 2 oz each feed. Demonstration was done for using the SNS and FOB practiced getting the tubing into Gabrielle's mouth while she was latched. They know how to attach a filled syringe with milk and gently push that into the tubing if necessary. They understand how to clean the tubing. Suggested they try this a couple of times a day to hopefully save them from having to give her a bottle and to allow Talalasha Josie more practice at the breast.  Encouraged mom to be patient with her milk production and getting the hang of pumping. They will be seen again in one week. I have spent 90 minutes with family today in which greater than 50% of this time was spent in counseling/coordination of care regarding Patient and family education.

## 2023-09-30 LAB — PLACENTA IN STORAGE: NORMAL

## 2023-10-04 ENCOUNTER — TELEPHONE (OUTPATIENT)
Dept: OBGYN CLINIC | Facility: MEDICAL CENTER | Age: 28
End: 2023-10-04

## 2023-10-04 NOTE — TELEPHONE ENCOUNTER
LVM for patient to let her know FMLA was faxed. Asked patient to call back to see if she would like a copy mailed to her or if she would like to  her own copy.

## 2023-10-30 ENCOUNTER — POSTPARTUM VISIT (OUTPATIENT)
Dept: OBGYN CLINIC | Facility: CLINIC | Age: 28
End: 2023-10-30
Payer: COMMERCIAL

## 2023-10-30 VITALS
BODY MASS INDEX: 24.66 KG/M2 | SYSTOLIC BLOOD PRESSURE: 118 MMHG | HEIGHT: 63 IN | WEIGHT: 139.2 LBS | DIASTOLIC BLOOD PRESSURE: 75 MMHG

## 2023-10-30 DIAGNOSIS — Q52.5 LABIAL FUSION: ICD-10-CM

## 2023-10-30 DIAGNOSIS — R35.0 URINARY FREQUENCY: ICD-10-CM

## 2023-10-30 PROBLEM — Z67.91 RH NEGATIVE STATE IN ANTEPARTUM PERIOD, THIRD TRIMESTER: Status: RESOLVED | Noted: 2023-03-05 | Resolved: 2023-10-30

## 2023-10-30 PROBLEM — O26.893 RH NEGATIVE STATE IN ANTEPARTUM PERIOD, THIRD TRIMESTER: Status: RESOLVED | Noted: 2023-03-05 | Resolved: 2023-10-30

## 2023-10-30 PROCEDURE — 87086 URINE CULTURE/COLONY COUNT: CPT | Performed by: OBSTETRICS & GYNECOLOGY

## 2023-10-30 PROCEDURE — 81002 URINALYSIS NONAUTO W/O SCOPE: CPT | Performed by: OBSTETRICS & GYNECOLOGY

## 2023-10-30 RX ORDER — CLOBETASOL PROPIONATE 0.5 MG/G
OINTMENT TOPICAL 2 TIMES DAILY
Qty: 30 G | Refills: 0 | Status: SHIPPED | OUTPATIENT
Start: 2023-10-30

## 2023-10-30 RX ORDER — ESTRADIOL 0.1 MG/G
CREAM VAGINAL
Qty: 42.5 G | Refills: 0 | Status: SHIPPED | OUTPATIENT
Start: 2023-10-30

## 2023-10-30 NOTE — PROGRESS NOTES
Postpartum Visit   [facility]  [address], [address], [place], [place]    Assessment/Plan:  Leif Pearl is a 25y.o. year old  who presents for postpartum visit. Routine Postpartum Care  - Normal postpartum exam  - Contraception:  undecided  - Depression Screen: 3  - Feeding: formula and breast  - Psychosocial support: appropriate  - Patient Education: Postpartum resources including Pelvic Health PT and Baby [facility] Me  - Cervical cancer screening Up to Date, done       Additional Problems:  1. Postpartum exam    2.  (spontaneous vaginal delivery)          Subjective:     CC: Postpartum visit    Golden Muñoz is a 25 y.o. female  who presents for a postpartum visit. She is approximately 6 weeks postpartum following a  on 23 at 38 weeks. Postpartum course has been uncomplicated. Bleeding -spotting Bowel function is normal. She reports increased frequency. Patient is not sexually active. Contraception method is none. Postpartum depression screening: negative. The following portions of the patient's history were reviewed and updated as appropriate: allergies, current medications, past family history, past medical history, obstetric history, gynecologic history, past social history, past surgical history and problem list.      Objective:  /75   Ht 5' 3" (1.6 m)   Wt 63.1 kg (139 lb 3.2 oz)   LMP 2022 (Exact Date)   Breastfeeding Yes Comment: breast an dbottle  BMI 24.66 kg/m²   Pregravid Weight/BMI: 54.4 kg (120 lb) (BMI 21.26)  Current Weight: 63.1 kg (139 lb 3.2 oz)   Total Weight Gain: 15.9 kg (35 lb)   Pre-Jose Vitals      Flowsheet Row Most Recent Value   Prenatal Assessment    Prenatal Vitals    Blood Pressure 118/75   Weight - Scale 63.1 kg (139 lb 3.2 oz)   Urine Albumin/Glucose    Dilation/Effacement/Station    Vaginal Drainage    Edema              General: Well appearing, no distress. Mood and affect: Appropriate.   Respiratory: Unlabored breathing. Clear to auscultate. Cardiovascular: Regular rate and rhythm. Abdomen: Soft, nontender    Vulva: skin bridge noted   Vagina: granulation tissue noted   Urethra: normal meatus  Extremities: Warm and well perfused. Non tender. Area infiltrated with bupivicaine 0.25% 3 cc and cut with scissors. 4-0 vicryl placed on each site.

## 2023-11-01 LAB — BACTERIA UR CULT: NORMAL

## 2023-11-20 NOTE — PROGRESS NOTES
Assessment/Plan:      Diagnoses and all orders for this visit:    Vulval lesion    Lactating mother      -Reviewed with patient area is now healed. Remaining suture removed with patient consent. Reviewed with patient can stop using clobetasol and estrogen cream.  -Reviewed addition of boost food products or moringa 500-1,000 mg daily. Along with placing baby to breast for routine feedings. Good hydration and food intake can help increase milk supply  -Reviewed option for pelvic floor physical therapy. Patient declines at this time. - signs and symptoms to report reviewed     RTO 3/2024 for Annual exam or sooner as needed     Subjective:     Patient ID: Wil Ho is a 25 y.o. female. HPI here for follow-up of repair of vulvar skin bridge.  @ 38.2 gestational weeks on 23 female infant weighing 7 pounds 5 ounces with Apgars of 8/9. Was seen in office on 10/30/23 noted to have vulvar skin bridge. Area was repaired and patient was given estrogen vaginal cream.  Denies pain. Is breast and formula feeding. Has met with [facility] lactation states her milk supply is low. Minimal amount of bleeding. Is planning to use condoms for contraception. Has not been sexually active yet. Denies bowel or bladder concerns. 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 States she has a good support system denies depression. Last Pap smear 3/24/22 NILM    Review of Systems   Constitutional:  Negative for chills, fatigue and fever. Respiratory: Negative. Cardiovascular: Negative. Genitourinary: Negative. Objective:  /84   Ht 5' 3" (1.6 m)   Wt 63.2 kg (139 lb 6.4 oz)   Breastfeeding Yes   BMI 24.69 kg/m²      Physical Exam  Vitals reviewed. Exam conducted with a chaperone present. Constitutional:       Appearance: Normal appearance. Genitourinary:     General: Normal vulva. Exam position: Lithotomy position. Labia:         Right: No rash, tenderness, lesion or injury. Left: No rash, tenderness, lesion or injury. Neurological:      Mental Status: She is alert and oriented to person, place, and time.    Psychiatric:         Mood and Affect: Mood normal.         Behavior: Behavior normal.

## 2023-11-21 ENCOUNTER — OFFICE VISIT (OUTPATIENT)
Dept: OBGYN CLINIC | Facility: CLINIC | Age: 28
End: 2023-11-21
Payer: COMMERCIAL

## 2023-11-21 VITALS
WEIGHT: 139.4 LBS | DIASTOLIC BLOOD PRESSURE: 84 MMHG | BODY MASS INDEX: 24.7 KG/M2 | SYSTOLIC BLOOD PRESSURE: 122 MMHG | HEIGHT: 63 IN

## 2023-11-21 DIAGNOSIS — N90.89 VULVAL LESION: Primary | ICD-10-CM

## 2023-11-21 PROCEDURE — 99213 OFFICE O/P EST LOW 20 MIN: CPT | Performed by: NURSE PRACTITIONER

## 2024-01-24 ENCOUNTER — OFFICE VISIT (OUTPATIENT)
Dept: URGENT CARE | Facility: CLINIC | Age: 29
End: 2024-01-24
Payer: COMMERCIAL

## 2024-01-24 VITALS
HEIGHT: 63 IN | TEMPERATURE: 97.6 F | HEART RATE: 65 BPM | OXYGEN SATURATION: 99 % | RESPIRATION RATE: 18 BRPM | SYSTOLIC BLOOD PRESSURE: 114 MMHG | WEIGHT: 130 LBS | DIASTOLIC BLOOD PRESSURE: 68 MMHG | BODY MASS INDEX: 23.04 KG/M2

## 2024-01-24 DIAGNOSIS — R21 RASH: Primary | ICD-10-CM

## 2024-01-24 PROCEDURE — 99213 OFFICE O/P EST LOW 20 MIN: CPT

## 2024-01-24 NOTE — PROGRESS NOTES
[facility]        NAME: Ana Luisa Penny is a 28 y.o. female  : 1995    MRN: [id number]  DATE: 2024  TIME: 5:48 PM    Assessment and Plan   Rash [R21]  1. Rash              Patient Instructions       Follow up with PCP in 3-5 days.  Proceed to  ER if symptoms worsen.    Chief Complaint     Chief Complaint   Patient presents with    Rash     Left side of the face rash. Started this morning. Itchy. Small red area on left ear. Started very suddenly.         History of Present Illness       29 y/o F presents for facial rash x this AM. Located on the B/L sides of the face, including both ears. Denies any changes in soaps, detergents, laundry products, beauty products, animals, or anything in general. No recent illness Admits he in-laws were over recently. Using topical benadryl and Zyrtec with relief. Main concern is to r/o shingles.     Rash  Pertinent negatives include no fever.       Review of Systems   Review of Systems   Constitutional:  Negative for chills and fever.   Skin:  Positive for rash.         Current Medications     No current outpatient medications on file.    Current Allergies     Allergies as of 2024    (No Known Allergies)            The following portions of the patient's history were reviewed and updated as appropriate: allergies, current medications, past family history, past medical history, past social history, past surgical history and problem list.     History reviewed. No pertinent past medical history.    History reviewed. No pertinent surgical history.    Family History   Problem Relation Age of Onset    Hypertension Mother     No Known Problems Father     No Known Problems Sister     No Known Problems Brother     Breast cancer Maternal Grandmother     Pancreatic cancer Maternal Grandmother     Heart attack Maternal Grandfather     Breast cancer Paternal Grandmother     Stroke Paternal Grandfather          Medications have been verified.        Objective  "  /68   Pulse 65   Temp 97.6 °F (36.4 °C)   Resp 18   Ht 5' 3\" (1.6 m)   Wt 59 kg (130 lb)   SpO2 99%   BMI 23.03 kg/m²   No LMP recorded.       Physical Exam     Physical Exam  Vitals and nursing note reviewed.   Constitutional:       General: She is not in acute distress.     Appearance: She is not toxic-appearing.   HENT:      Head: Normocephalic and atraumatic.   Eyes:      Conjunctiva/sclera: Conjunctivae normal.   Pulmonary:      Effort: Pulmonary effort is normal.   Skin:     Comments: Dermatitis of L cheek, ear and R side of face by the TMJ   Neurological:      Mental Status: She is alert.   Psychiatric:         Mood and Affect: Mood normal.         Behavior: Behavior normal.                   "

## 2024-01-25 ENCOUNTER — AMB VIDEO VISIT (OUTPATIENT)
Dept: OTHER | Facility: HOSPITAL | Age: 29
End: 2024-01-25
Payer: COMMERCIAL

## 2024-01-25 VITALS — RESPIRATION RATE: 16 BRPM

## 2024-01-25 DIAGNOSIS — L25.9 CONTACT DERMATITIS, UNSPECIFIED CONTACT DERMATITIS TYPE, UNSPECIFIED TRIGGER: Primary | ICD-10-CM

## 2024-01-25 PROCEDURE — 99212 OFFICE O/P EST SF 10 MIN: CPT | Performed by: NURSE PRACTITIONER

## 2024-01-25 RX ORDER — PREDNISONE 10 MG/1
TABLET ORAL DAILY
Qty: 10 TABLET | Refills: 0 | Status: SHIPPED | OUTPATIENT
Start: 2024-01-25 | End: 2024-01-29

## 2024-01-25 NOTE — PROGRESS NOTES
Required Documentation:  Encounter provider CARRIE Ling    Provider located at [facility]  VIRTUAL CARE   [address] [place] [phone]    Identify all parties in room with patient during virtual visit:  No one else    The patient was identified by name and date of birth. Ana Luisa Penny was informed that this is a telemedicine visit and that the visit is being conducted through the [employer] platform. She agrees to proceed..  My office door was closed. No one else was in the room.  She acknowledged consent and understanding of privacy and security of the video platform. The patient has agreed to participate and understands they can discontinue the visit at any time.    Verification of patient location:    Patient is located at work in the following state in which I hold an active license [place]    Patient is aware this is a billable service.     Reason for visit is No chief complaint on file.       Subjective  This is a 28 year old female here today for video visit.  She states she started with a rash yesterday morning.  She staes rash was only on left side of face now on chest and arms.  She states the rash on rash is itchy. No fever, body aches or chills.  No drainage from rash.  No new soaps, lotions or detergent.  No new medications. She is breastfeeding.            No past medical history on file.    No past surgical history on file.     No Known Allergies    Review of Systems   Constitutional: Negative.    Respiratory: Negative.     Cardiovascular: Negative.    Skin:  Positive for rash.   Psychiatric/Behavioral: Negative.         Video Exam    Vitals:    01/25/24 0816   Resp: 16       Physical Exam  Constitutional:       General: She is not in acute distress.     Appearance: Normal appearance. She is not ill-appearing or toxic-appearing.   HENT:      Head:        Comments: Erythemic rash on face   Pulmonary:      Effort: Pulmonary effort is  normal. No respiratory distress.   Skin:     Comments: Several scattered areas on chest and left arm.    Neurological:      Mental Status: She is alert and oriented to person, place, and time.   Psychiatric:         Mood and Affect: Mood normal.         Behavior: Behavior normal.         Thought Content: Thought content normal.         Judgment: Judgment normal.         Visit Time  Total Visit Duration: 10 minutes    Assessment/Plan:    Diagnoses and all orders for this visit:    Contact dermatitis, unspecified contact dermatitis type, unspecified trigger  -     predniSONE 10 mg tablet; Take 4 tablets (40 mg total) by mouth daily for 1 day, THEN 3 tablets (30 mg total) daily for 1 day, THEN 2 tablets (20 mg total) daily for 1 day, THEN 1 tablet (10 mg total) daily for 1 day.  -     hydrocortisone 2.5 % cream; Apply topically 2 (two) times a day for 5 days        Patient Instructions   Steroid cream sent to pharmacy.  Start steroid cream.  Start steroid tomorrow if no improvement.  Do not breastfeed for 4 hours after taking.  You can call and discuss with peditrician also. Follow up with PCP if no improvement.  Go to ER with any worsening symptoms.

## 2024-01-25 NOTE — PATIENT INSTRUCTIONS
Steroid cream sent to pharmacy.  Start steroid cream.  Start steroid tomorrow if no improvement.  Do not breastfeed for 4 hours after taking.  You can call and discuss with peditrician also. Follow up with PCP if no improvement.  Go to ER with any worsening symptoms.

## 2024-01-25 NOTE — CARE ANYWHERE EVISITS
Visit Summary for Ana Luisa Penny - Gender: Female - Date of Birth: 1995  Date: [id number] - Duration: 8 minutes  Patient: Ana Luisa Penny  Provider: Pardeep BUTLER    Patient Contact Information  Address  [address] [place] [address]; [place] [address]  [phone]    Visit Topics  Rash (for some reason it wona€™t let me select rash in the above list) [Added By: Self - 2024-01-25]    Triage Questions   What is your current physical address in the event of a medical emergency? Answer []  Are you allergic to any medications? Answer []  Are you now or could you be pregnant? Answer []  Do you have any immune system compromise or chronic lung   disease? Answer []  Do you have any vulnerable family members in the home (infant, pregnant, cancer, elderly)? Answer []     Conversation Transcripts  [0A][0A] [Notification] You are connected with Pardeep BUTLER, Urgent Care Specialist.[0A][Notification] Ana Luisa Penny is located in [place].[0A][Notification] Ana Luisa Penny has shared health history...[0A]    Diagnosis  Rash and other nonspecific skin eruption    Procedures  Value: [id number] Code: CPT-4 UNLISTED E&M SERVICE    Medications Prescribed    No prescriptions ordered    Electronically signed by: Pardeep Rodríguez(NPI [id number])

## 2024-08-02 ENCOUNTER — APPOINTMENT (OUTPATIENT)
Dept: LAB | Facility: HOSPITAL | Age: 29
End: 2024-08-02

## 2024-08-02 DIAGNOSIS — Z00.8 HEALTH EXAMINATION IN POPULATION SURVEY: ICD-10-CM

## 2024-08-02 LAB
CHOLEST SERPL-MCNC: 141 MG/DL
EST. AVERAGE GLUCOSE BLD GHB EST-MCNC: 97 MG/DL
HBA1C MFR BLD: 5 %
HDLC SERPL-MCNC: 50 MG/DL
LDLC SERPL CALC-MCNC: 75 MG/DL (ref 0–100)
NONHDLC SERPL-MCNC: 91 MG/DL
TRIGL SERPL-MCNC: 78 MG/DL

## 2024-08-02 PROCEDURE — 83036 HEMOGLOBIN GLYCOSYLATED A1C: CPT

## 2024-08-02 PROCEDURE — 36415 COLL VENOUS BLD VENIPUNCTURE: CPT

## 2024-08-02 PROCEDURE — 80061 LIPID PANEL: CPT

## 2024-09-07 ENCOUNTER — APPOINTMENT (EMERGENCY)
Dept: ULTRASOUND IMAGING | Facility: HOSPITAL | Age: 29
End: 2024-09-07
Payer: COMMERCIAL

## 2024-09-07 ENCOUNTER — HOSPITAL ENCOUNTER (EMERGENCY)
Facility: HOSPITAL | Age: 29
Discharge: HOME | End: 2024-09-07
Attending: EMERGENCY MEDICINE
Payer: COMMERCIAL

## 2024-09-07 ENCOUNTER — NURSE TRIAGE (OUTPATIENT)
Dept: OTHER | Facility: OTHER | Age: 29
End: 2024-09-07

## 2024-09-07 VITALS
SYSTOLIC BLOOD PRESSURE: 107 MMHG | RESPIRATION RATE: 18 BRPM | OXYGEN SATURATION: 100 % | DIASTOLIC BLOOD PRESSURE: 64 MMHG | HEART RATE: 72 BPM | TEMPERATURE: 98.3 F

## 2024-09-07 DIAGNOSIS — B96.89 BACTERIAL VAGINOSIS: ICD-10-CM

## 2024-09-07 DIAGNOSIS — O03.9 SPONTANEOUS ABORTION: Primary | ICD-10-CM

## 2024-09-07 DIAGNOSIS — N76.0 BACTERIAL VAGINOSIS: ICD-10-CM

## 2024-09-07 LAB
ABO GROUP BLD: NORMAL
AMORPH URATE CRY URNS QL MICRO: ABNORMAL
APTT PPP: 34 SECONDS (ref 23–34)
B-HCG SERPL-ACNC: 183.3 MIU/ML (ref 0–5)
BACTERIA UR QL AUTO: ABNORMAL /HPF
BASOPHILS # BLD AUTO: 0.04 THOUSANDS/ÂΜL (ref 0–0.1)
BASOPHILS NFR BLD AUTO: 1 % (ref 0–1)
BILIRUB UR QL STRIP: NEGATIVE
BLD GP AB SCN SERPL QL: NEGATIVE
CLARITY UR: CLEAR
COLOR UR: ABNORMAL
EOSINOPHIL # BLD AUTO: 0.02 THOUSAND/ÂΜL (ref 0–0.61)
EOSINOPHIL NFR BLD AUTO: 0 % (ref 0–6)
ERYTHROCYTE [DISTWIDTH] IN BLOOD BY AUTOMATED COUNT: 11.9 % (ref 11.6–15.1)
EXT PREGNANCY TEST URINE: POSITIVE
EXT. CONTROL: ABNORMAL
GLUCOSE UR STRIP-MCNC: NEGATIVE MG/DL
HCT VFR BLD AUTO: 41.5 % (ref 34.8–46.1)
HGB BLD-MCNC: 14.4 G/DL (ref 11.5–15.4)
HGB UR QL STRIP.AUTO: ABNORMAL
IMM GRANULOCYTES # BLD AUTO: 0.03 THOUSAND/UL (ref 0–0.2)
IMM GRANULOCYTES NFR BLD AUTO: 1 % (ref 0–2)
INR PPP: 1.04 (ref 0.85–1.19)
KETONES UR STRIP-MCNC: ABNORMAL MG/DL
LEUKOCYTE ESTERASE UR QL STRIP: NEGATIVE
LYMPHOCYTES # BLD AUTO: 2.04 THOUSANDS/ÂΜL (ref 0.6–4.47)
LYMPHOCYTES NFR BLD AUTO: 32 % (ref 14–44)
MAGNESIUM SERPL-MCNC: 1.8 MG/DL (ref 1.9–2.7)
MCH RBC QN AUTO: 30.7 PG (ref 26.8–34.3)
MCHC RBC AUTO-ENTMCNC: 34.7 G/DL (ref 31.4–37.4)
MCV RBC AUTO: 89 FL (ref 82–98)
MONOCYTES # BLD AUTO: 0.36 THOUSAND/ÂΜL (ref 0.17–1.22)
MONOCYTES NFR BLD AUTO: 6 % (ref 4–12)
NEUTROPHILS # BLD AUTO: 3.82 THOUSANDS/ÂΜL (ref 1.85–7.62)
NEUTS SEG NFR BLD AUTO: 60 % (ref 43–75)
NITRITE UR QL STRIP: NEGATIVE
NON-SQ EPI CELLS URNS QL MICRO: ABNORMAL /HPF
NRBC BLD AUTO-RTO: 0 /100 WBCS
PH UR STRIP.AUTO: 6.5 [PH]
PLATELET # BLD AUTO: 200 THOUSANDS/UL (ref 149–390)
PMV BLD AUTO: 10 FL (ref 8.9–12.7)
PROT UR STRIP-MCNC: NEGATIVE MG/DL
PROTHROMBIN TIME: 14.1 SECONDS (ref 12.3–15)
RBC # BLD AUTO: 4.69 MILLION/UL (ref 3.81–5.12)
RBC #/AREA URNS AUTO: ABNORMAL /HPF
RH BLD: NEGATIVE
SP GR UR STRIP.AUTO: 1.01 (ref 1–1.03)
SPECIMEN EXPIRATION DATE: NORMAL
UROBILINOGEN UR STRIP-ACNC: <2 MG/DL
WBC # BLD AUTO: 6.31 THOUSAND/UL (ref 4.31–10.16)
WBC #/AREA URNS AUTO: ABNORMAL /HPF

## 2024-09-07 PROCEDURE — 81001 URINALYSIS AUTO W/SCOPE: CPT | Performed by: EMERGENCY MEDICINE

## 2024-09-07 PROCEDURE — 96372 THER/PROPH/DIAG INJ SC/IM: CPT

## 2024-09-07 PROCEDURE — 83735 ASSAY OF MAGNESIUM: CPT | Performed by: EMERGENCY MEDICINE

## 2024-09-07 PROCEDURE — 81025 URINE PREGNANCY TEST: CPT | Performed by: EMERGENCY MEDICINE

## 2024-09-07 PROCEDURE — 81514 NFCT DS BV&VAGINITIS DNA ALG: CPT | Performed by: EMERGENCY MEDICINE

## 2024-09-07 PROCEDURE — 86900 BLOOD TYPING SEROLOGIC ABO: CPT | Performed by: EMERGENCY MEDICINE

## 2024-09-07 PROCEDURE — 87591 N.GONORRHOEAE DNA AMP PROB: CPT | Performed by: EMERGENCY MEDICINE

## 2024-09-07 PROCEDURE — 76815 OB US LIMITED FETUS(S): CPT

## 2024-09-07 PROCEDURE — 86850 RBC ANTIBODY SCREEN: CPT | Performed by: EMERGENCY MEDICINE

## 2024-09-07 PROCEDURE — 99285 EMERGENCY DEPT VISIT HI MDM: CPT | Performed by: EMERGENCY MEDICINE

## 2024-09-07 PROCEDURE — 36415 COLL VENOUS BLD VENIPUNCTURE: CPT | Performed by: EMERGENCY MEDICINE

## 2024-09-07 PROCEDURE — 87491 CHLMYD TRACH DNA AMP PROBE: CPT | Performed by: EMERGENCY MEDICINE

## 2024-09-07 PROCEDURE — 99284 EMERGENCY DEPT VISIT MOD MDM: CPT

## 2024-09-07 PROCEDURE — 85025 COMPLETE CBC W/AUTO DIFF WBC: CPT | Performed by: EMERGENCY MEDICINE

## 2024-09-07 PROCEDURE — 85730 THROMBOPLASTIN TIME PARTIAL: CPT | Performed by: EMERGENCY MEDICINE

## 2024-09-07 PROCEDURE — 86901 BLOOD TYPING SEROLOGIC RH(D): CPT | Performed by: EMERGENCY MEDICINE

## 2024-09-07 PROCEDURE — 85610 PROTHROMBIN TIME: CPT | Performed by: EMERGENCY MEDICINE

## 2024-09-07 PROCEDURE — 84702 CHORIONIC GONADOTROPIN TEST: CPT | Performed by: EMERGENCY MEDICINE

## 2024-09-07 RX ADMIN — RHO(D) IMMUNE GLOBULIN (HUMAN) 300 MCG: 1500 SOLUTION INTRAMUSCULAR at 16:51

## 2024-09-07 NOTE — TELEPHONE ENCOUNTER
"Reason for Disposition  • [1] Constant abdominal pain AND [2] present > 2 hours    Answer Assessment - Initial Assessment Questions  1. ONSET: \"When did this bleeding start?\"        Today around this morning and became heavier 45 mins ago    2. DESCRIPTION: \"Describe the bleeding that you are having.\" \"How much bleeding is there?\"     - SPOTTING: spotting, or pinkish / brownish mucous discharge; does not fill panti-liner or pad     - MILD:  less than 1 pad / hour; less than patient's usual menstrual bleeding    - MODERATE: 1-2 pads / hour; 1 menstrual cup every 6 hours; small-medium blood clots (e.g., pea, grape, small coin)    - SEVERE: soaking 2 or more pads/hour for 2 or more hours; 1 menstrual cup every 2 hours; bleeding not contained by pads or continuous red blood from vagina; large blood clots (e.g., golf ball, large coin)       Moderate 1 pad per hour    3. ABDOMINAL PAIN SEVERITY: If present, ask: \"How bad is it?\"  (e.g., Scale 1-10; mild, moderate, or severe)    - MILD (1-3): doesn't interfere with normal activities, abdomen soft and not tender to touch     - MODERATE (4-7): interferes with normal activities or awakens from sleep, tender to touch     - SEVERE (8-10): excruciating pain, doubled over, unable to do any normal activities      Cramping 3/10    4. PREGNANCY: \"Do you know how many weeks or months pregnant you are?\" \"When was the first day of your last normal menstrual period?\"      5 weeks     5. HEMODYNAMIC STATUS: \"Are you weak or feeling lightheaded?\" If Yes, ask: \"Can you stand and walk normally?\"       Denies    6. OTHER SYMPTOMS: \"What other symptoms are you having with the bleeding?\" (e.g., passed tissue, vaginal discharge, fever, menstrual-type cramps)      Denies    Protocols used: Pregnancy - Vaginal Bleeding Less Than 20 Weeks [id number]-IC    "

## 2024-09-07 NOTE — TELEPHONE ENCOUNTER
"Regardin weeks/ vaginal bleeding  ----- Message from John RAO sent at 2024 11:31 AM EDT -----  \" I am newly pregnant, like five weeks. I'm having a moderate amount of bleeding.\"    "

## 2024-09-08 LAB
C GLABRATA DNA VAG QL NAA+PROBE: NEGATIVE
C KRUSEI DNA VAG QL NAA+PROBE: NEGATIVE
CANDIDA SP 6 PNL VAG NAA+PROBE: NEGATIVE
T VAGINALIS DNA VAG QL NAA+PROBE: NEGATIVE
VAGINOSIS/ITIS DNA PNL VAG PROBE+SIG AMP: POSITIVE

## 2024-09-08 RX ORDER — METRONIDAZOLE 250 MG/1
250 TABLET ORAL EVERY 8 HOURS SCHEDULED
Qty: 21 TABLET | Refills: 0 | Status: SHIPPED | OUTPATIENT
Start: 2024-09-08 | End: 2024-09-15

## 2024-09-08 NOTE — RESULT ENCOUNTER NOTE
Called patient and informed her of BV, called in script of flagyl 250mg tid x 7 days to [facility] [place] and instructed patient to f/u with ob/gyn for recheck.

## 2024-09-09 ENCOUNTER — APPOINTMENT (OUTPATIENT)
Dept: LAB | Facility: HOSPITAL | Age: 29
End: 2024-09-09
Payer: COMMERCIAL

## 2024-09-09 DIAGNOSIS — O03.9 SPONTANEOUS ABORTION: ICD-10-CM

## 2024-09-09 LAB — B-HCG SERPL-ACNC: 58.7 MIU/ML (ref 0–5)

## 2024-09-09 PROCEDURE — 36415 COLL VENOUS BLD VENIPUNCTURE: CPT

## 2024-09-09 PROCEDURE — 84702 CHORIONIC GONADOTROPIN TEST: CPT

## 2024-09-10 ENCOUNTER — OFFICE VISIT (OUTPATIENT)
Age: 29
End: 2024-09-10
Payer: COMMERCIAL

## 2024-09-10 VITALS — WEIGHT: 123.4 LBS | BODY MASS INDEX: 21.86 KG/M2 | SYSTOLIC BLOOD PRESSURE: 112 MMHG | DIASTOLIC BLOOD PRESSURE: 80 MMHG

## 2024-09-10 DIAGNOSIS — O03.4 INCOMPLETE MISCARRIAGE: Primary | ICD-10-CM

## 2024-09-10 PROCEDURE — 99213 OFFICE O/P EST LOW 20 MIN: CPT | Performed by: NURSE PRACTITIONER

## 2024-09-10 NOTE — PROGRESS NOTES
Assessment/Plan:     1. Incomplete miscarriage  Very early, 5+ weeks.  Quant HCG decreasing.  Explained need to confirm down to a negative.  Order placed.  She is taking a prenatal vitamin.  Will begin trying to conceive again with her next period.    - hCG, quantitative; Future          Subjective:     Patient ID: Ana Luisa Penny is a 29 y.o. female.    HPI  PROBLEM VISIT  CC: post ED, bleeding in early pregnancy    30 yo     Seen in ED on 24 for vaginal bleeding in early pregnancy.    She was 5w3d gestation based upon her LMP of 24.    Bleeding started , soaking a pad an hour with cramping.  HCG Quant at that point was 183.3.  They also collected a vaginal culture and G/C.  Vaginal culture just resulted and is positive for bacterial vaginosis. They prescribed metronidazole oral for her.  G/C is still pending.  Her blood type is O negative and Rhogam was administered.  Repeat quant HCG drawn  has decreased to 58.7.    At this lighter vaginal bleeding and still with some cramping.    Review of Systems   Constitutional:  Negative for chills and fever.   Genitourinary:  Positive for vaginal bleeding (small amount, w/ some cramping). Negative for dyspareunia, dysuria, frequency, genital sores, hematuria, menstrual problem, pelvic pain, urgency, vaginal discharge and vaginal pain.         Objective:    Visit Vitals  /80   Wt 56 kg (123 lb 6.4 oz)   LMP 2024   Breastfeeding No   BMI 21.86 kg/m²   OB Status Unknown   Smoking Status Never   BSA 1.58 m²        Physical Exam  Constitutional:       General: She is not in acute distress.     Appearance: Normal appearance. She is well-developed and normal weight. She is not ill-appearing or diaphoretic.   HENT:      Head: Normocephalic and atraumatic.   Eyes:      Pupils: Pupils are equal, round, and reactive to light.   Pulmonary:      Effort: Pulmonary effort is normal.   Abdominal:      General: Abdomen is flat.      Palpations: Abdomen is  soft.   Genitourinary:     General: Normal vulva.      Exam position: Lithotomy position.      Labia:         Right: No rash, tenderness, lesion or injury.         Left: No rash, tenderness, lesion or injury.       Vagina: No signs of injury and foreign body. Bleeding (scant) present. No vaginal discharge, erythema or tenderness.      Cervix: No cervical motion tenderness, discharge or friability.      Uterus: Not enlarged and not tender.       Adnexa:         Right: No mass or tenderness.          Left: No mass or tenderness.     Skin:     General: Skin is warm and dry.   Neurological:      General: No focal deficit present.      Mental Status: She is alert and oriented to person, place, and time.   Psychiatric:         Mood and Affect: Mood normal.         Behavior: Behavior normal.         Thought Content: Thought content normal.         Judgment: Judgment normal.

## 2024-09-11 LAB
C TRACH DNA SPEC QL NAA+PROBE: NEGATIVE
N GONORRHOEA DNA SPEC QL NAA+PROBE: NEGATIVE

## 2024-09-14 ENCOUNTER — LAB (OUTPATIENT)
Dept: LAB | Facility: HOSPITAL | Age: 29
End: 2024-09-14
Payer: COMMERCIAL

## 2024-09-14 DIAGNOSIS — O03.4 INCOMPLETE MISCARRIAGE: ICD-10-CM

## 2024-09-14 LAB — B-HCG SERPL-ACNC: 8.1 MIU/ML (ref 0–5)

## 2024-09-14 PROCEDURE — 84702 CHORIONIC GONADOTROPIN TEST: CPT

## 2024-09-14 PROCEDURE — 36415 COLL VENOUS BLD VENIPUNCTURE: CPT

## 2024-09-16 DIAGNOSIS — O03.4 INCOMPLETE MISCARRIAGE: Primary | ICD-10-CM

## 2024-09-24 ENCOUNTER — LAB (OUTPATIENT)
Dept: LAB | Facility: HOSPITAL | Age: 29
End: 2024-09-24
Payer: COMMERCIAL

## 2024-09-24 DIAGNOSIS — O03.4 INCOMPLETE MISCARRIAGE: ICD-10-CM

## 2024-09-24 LAB — B-HCG SERPL-ACNC: 0.8 MIU/ML (ref 0–5)

## 2024-09-24 PROCEDURE — 36415 COLL VENOUS BLD VENIPUNCTURE: CPT

## 2024-09-24 PROCEDURE — 84702 CHORIONIC GONADOTROPIN TEST: CPT

## 2024-11-07 ENCOUNTER — TELEPHONE (OUTPATIENT)
Age: 29
End: 2024-11-07

## 2024-11-07 NOTE — TELEPHONE ENCOUNTER
Pt called - set up D&V- LMP 10/08. Pt wanted a call from provider if her OB care will be different this time because she had a miscarriage last time.

## 2024-11-08 ENCOUNTER — APPOINTMENT (OUTPATIENT)
Dept: LAB | Facility: HOSPITAL | Age: 29
End: 2024-11-08
Payer: COMMERCIAL

## 2024-11-08 DIAGNOSIS — N91.2 AMENORRHEA: ICD-10-CM

## 2024-11-08 DIAGNOSIS — N91.2 AMENORRHEA: Primary | ICD-10-CM

## 2024-11-08 LAB
B-HCG SERPL-ACNC: 726.2 MIU/ML (ref 0–5)
PROGEST SERPL-MCNC: 22.11 NG/ML

## 2024-11-08 PROCEDURE — 36415 COLL VENOUS BLD VENIPUNCTURE: CPT

## 2024-11-08 PROCEDURE — 84702 CHORIONIC GONADOTROPIN TEST: CPT

## 2024-11-08 PROCEDURE — 84144 ASSAY OF PROGESTERONE: CPT

## 2024-11-11 ENCOUNTER — APPOINTMENT (OUTPATIENT)
Dept: LAB | Facility: HOSPITAL | Age: 29
End: 2024-11-11
Payer: COMMERCIAL

## 2024-11-11 DIAGNOSIS — Z32.01 POSITIVE PREGNANCY TEST: Primary | ICD-10-CM

## 2024-11-11 DIAGNOSIS — Z32.01 POSITIVE PREGNANCY TEST: ICD-10-CM

## 2024-11-11 LAB — B-HCG SERPL-ACNC: 3631 MIU/ML (ref 0–5)

## 2024-11-11 PROCEDURE — 84702 CHORIONIC GONADOTROPIN TEST: CPT

## 2024-11-11 PROCEDURE — 36415 COLL VENOUS BLD VENIPUNCTURE: CPT

## 2024-11-12 ENCOUNTER — TELEPHONE (OUTPATIENT)
Age: 29
End: 2024-11-12

## 2024-11-12 NOTE — TELEPHONE ENCOUNTER
----- Message from Greta Haines MD sent at 11/12/2024  7:42 AM EST -----  Notify excellent rise.  Follow up for sono as scheduled.

## 2024-12-04 ENCOUNTER — ULTRASOUND (OUTPATIENT)
Age: 29
End: 2024-12-04
Payer: COMMERCIAL

## 2024-12-04 ENCOUNTER — RESULTS FOLLOW-UP (OUTPATIENT)
Age: 29
End: 2024-12-04

## 2024-12-04 VITALS
DIASTOLIC BLOOD PRESSURE: 60 MMHG | BODY MASS INDEX: 21.44 KG/M2 | HEIGHT: 63 IN | WEIGHT: 121 LBS | SYSTOLIC BLOOD PRESSURE: 122 MMHG

## 2024-12-04 DIAGNOSIS — N91.1 SECONDARY AMENORRHEA: Primary | ICD-10-CM

## 2024-12-04 PROCEDURE — 99214 OFFICE O/P EST MOD 30 MIN: CPT | Performed by: OBSTETRICS & GYNECOLOGY

## 2024-12-04 PROCEDURE — 76817 TRANSVAGINAL US OBSTETRIC: CPT | Performed by: OBSTETRICS & GYNECOLOGY

## 2024-12-04 NOTE — PROGRESS NOTES
Ultrasound Probe Disinfection    A transvaginal ultrasound was performed.   Prior to use, disinfection was performed with High Level Disinfection Process (Trophon)  Probe serial number  [id number]  was used    Radha Bustillos MA  12/04/24  1:02 PM

## 2024-12-04 NOTE — PROGRESS NOTES
"Assessment/Plan:      Secondary amenorrhea          -     SIUP @ 7 weeks 3 days by sono EDC 2025  -     AMB US OB < 14 weeks single or first gestation level 1    Other orders  -     Prenatal-FeFum-FA-DHA w/o A (PRENATAL + DHA PO); Take by mouth        Repeat sono 10-14 days    Subjective      Ana Luisa Penny is a 29 y.o.  LMP 7/15/2024 who presents with amenorrhea and + urine hCG.  Has missed Ab in 2024 then only one menses after quants cleared.  Some nausea.  No VB.    Cycle length: see above  Pregnancy testing:    Latest Reference Range & Units 24 10:07 24 16:22   HCG QUANTITATIVE 0 - 5 mIU/mL 726.2 (H) 3,631.0 (H)     Pregnancy imaging: not done.  Blood type: O negative.  Other lab results: none.    The following portions of the patient's history were reviewed and updated as appropriate: allergies, current medications, past family history, past medical history, past social history, past surgical history, and problem list.    Review of Systems  Pertinent items are noted in HPI.     Objective      /60 (BP Location: Left arm, Patient Position: Sitting, Cuff Size: Standard)   Ht 5' 3\" (1.6 m)   Wt 54.9 kg (121 lb)   LMP 10/08/2024 (Exact Date)   BMI 21.43 kg/m²     General: alert and oriented, in no acute distress   Heart: RRR   Lungs: Effort normal   Abdomen: soft, non-tender, without masses or organomegaly   Vulva: normal                     Imaging  Limited office ultrasound: see sono under OB Procedure          "

## 2024-12-18 ENCOUNTER — ULTRASOUND (OUTPATIENT)
Age: 29
End: 2024-12-18

## 2024-12-18 VITALS
DIASTOLIC BLOOD PRESSURE: 62 MMHG | WEIGHT: 119.6 LBS | BODY MASS INDEX: 21.19 KG/M2 | HEIGHT: 63 IN | SYSTOLIC BLOOD PRESSURE: 118 MMHG

## 2024-12-18 DIAGNOSIS — Z33.1 INCIDENTAL PREGNANCY: ICD-10-CM

## 2024-12-18 DIAGNOSIS — Z13.79 GENETIC SCREENING: Primary | ICD-10-CM

## 2024-12-18 NOTE — PROGRESS NOTES
Ultrasound Probe Disinfection    A transvaginal ultrasound was performed.   Prior to use, disinfection was performed with High Level Disinfection Process (Trophon)  Probe serial number  [id number]  was used    Radha Bustillos MA  12/18/24  3:25 PM

## 2024-12-20 ENCOUNTER — TELEPHONE (OUTPATIENT)
Age: 29
End: 2024-12-20

## 2024-12-24 ENCOUNTER — TELEPHONE (OUTPATIENT)
Age: 29
End: 2024-12-24

## 2024-12-26 ENCOUNTER — NURSE TRIAGE (OUTPATIENT)
Age: 29
End: 2024-12-26

## 2024-12-26 NOTE — TELEPHONE ENCOUNTER
"Patient is 10w4d  calling to report 6/10 pelvic cramping that began last night. Denies vaginal bleeding, LOF, committing, diarrhea, or constipation. Mild nausea on going since early pregnancy - unchanged. Advised tylenol, rest, hydrate, and heating pad on low setting. Call back with worsening or persistent pain, or vaginal bleeding. Patient verbalizes understanding.    ESC to Dr. Lopez for review.    Reason for Disposition   MILD abdominal pain (e.g., doesn't interfere with normal activities)    Answer Assessment - Initial Assessment Questions  1. LOCATION: \"Where does it hurt?\"       Pelvic cramping  2. RADIATION: \"Does the pain shoot anywhere else?\" (e.g., chest, back, shoulder)      Denies  3. ONSET: \"When did the pain begin?\" (e.g., minutes, hours or days ago)       Since last night  4. ONSET: \"Gradual or sudden onset?\"      Suddenly  5. PATTERN \"Does the pain come and go, or has it been constant since it started?\"       Intermittent and inconsistent  6. SEVERITY: \"How bad is the pain?\" \"What does it keep you from doing?\"  (e.g., Scale 1-10; mild, moderate, or severe)      6/10  7. RECURRENT SYMPTOM: \"Have you ever had this type of stomach pain before?\" If Yes, ask: \"When was the last time?\" and \"What happened that time?\"       This morning  8. CAUSE: \"What do you think is causing the stomach pain?\"      unsure  9. RELIEVING/AGGRAVATING FACTORS: \"What makes it better or worse?\" (e.g., antacids, bowel movement, movement)      Denies  10. OTHER SYMPTOMS: \"Do you have any other symptoms?\" (e.g., back pain, diarrhea, fever, urination pain, vaginal bleeding, vaginal discharge, vomiting)        Denies fever, urinary concerns, vaginal bleeding, LOF, vomiting, diarrhea, constipation, abnormal vaginal discharge  11. LAINA: \"What date are you expecting to deliver?\"        2024    Protocols used: Pregnancy - Abdominal Pain Less Than 20 Weeks EGA-Adult-OH    "

## 2025-01-06 NOTE — PATIENT INSTRUCTIONS
Congratulations on your pregnancy!  We thank you for allowing us to participate in your care.    NEXT STEPS    Go to the lab to have your prenatal bloodwork competed if you have not already done so.  There is a listing of [facility] and locations in your prenatal folder. You may also visit [email or web address] or call [phone].   Please be aware that some insurance companies may require you to go to a specific lab (ex. [employer] or "[employer] You can verify this by contacting your insurance company.   If you have decided to be screened for CF and SMA genetic testing, these tests require prior authorization and scheduling.  Prior Authorization is not a guarantee of payment. There may be out of pocket expenses that includes copays, deductibles and or coinsurance for your individual plan.  Please call [phone] if our team has not contacted you in 7 business days.  Please have your blood work completed prior to you next prenatal visit.    If you have decided to have genetic testing done at [facility], that will be scheduled by [facility]. You may have already scheduled this appointment.  If not, please call their office to schedule this appointment.  Based on the referral placed by our office, they will know how to schedule you appropriately.    Contact information for [facility] is located in your prenatal folder. The main phone number to their office is [phone].     Return to our office for your first routine prenatal visit.     Warning Signs During Pregnancy - If you experience any problems or concerns, call the office directly.  The list below includes warning signs your providers would like you to be aware of.  If you experience any of these at any time during your pregnancy, please call us as soon as possible.    Vaginal bleeding   Sharp abdominal pain that does not go away   Fever (more than 100.4?F and is not relieved with Tylenol)   Persistent vomiting lasting greater than 24  hours   Chest pain/Shortness of breath   Pain or burning when you urinate     Call the OFFICE [phone] for any questions/emergencies.  At night or on the weekend, calls go through a triage service, please indicate it is an emergency and the DOCTOR on call will be paged.    Remember to only use MyChart for non-urgent concerns or questions.    Our doctors deliver at [facility] in [place]. The address is provided below.     [facility]  [address] [place]  [address]     Please click on the links below to review our Pregnancy Essential Guide.    [facility] Pregnancy Essentials Guide  [facility] Women's Health ([email or web address])     [facility] - Virtual Tour ([employer])      To learn more about the Prenatal Education classes that [facility] offers, click the link below.  Prenatal Education Classes    Click on the link below to review [facility] Lab locations.  [facility] Lab Locations    [employer] resource  [employer] is a tool to connect you to community resources you may need.      Thank you,   Joyce GLOVER, RN  OB Nurse Navigator

## 2025-01-06 NOTE — PROGRESS NOTES
OB INTAKE INTERVIEW 2025    Patient is 29 y.o. who presents for OB intake at 12w3d.  She {is/is not:29668} accompanied by {accompanied by:05148} during this encounter.  The father of her baby (Luis Penny) {IS/ IS NOT:53987} involved in the pregnancy.      Patient's last menstrual period was 10/08/2024 (exact date).  Ultrasound: Measured 7 weeks 3 days on 24   Estimated Date of Delivery: 25 confirmed by dating ultrasound. 7 and 9 week US    Signs/Symptoms of Pregnancy  Current pregnancy symptoms: {.pregnancysymptoms:57478}  Headaches: {yes no (yes in bold):39982}  Cramping: {yes no (yes in bold):46894}  Spotting: {yes no (yes in bold):64198}  PICA cravings: {yes no (yes in bold):71839}    Diabetes:  There is no height or weight on file to calculate BMI.  If patient has 1 or more, please order early 1 hour GTT  History of GDM: no  BMI >35 no  History of PCOS or current metformin use: {yes no (yes in bold):62440}  History of LGA/macrosomic infant (4000g/9lbs): no    If patient has 2 or more, please order early 1 hour GTT  BMI>30 no  AMA: no  First degree relative with type 2 diabetes: {yes no (yes in bold):76908}  History of chronic HTN, hyperlipidemia, elevated A1C: {yes no (yes in bold):72972}  High risk race (, , ,  or ): {yes no (yes in bold):84308}    Hypertension: if you answer yes to any of the following, please order baseline preeclampsia labs (cbc, comprehensive metabolic panel, urine protein creatinine ratio, uric acid)  History of of chronic HTN: {yes no (yes in bold):88729}  History of gestational HTN: {yes no (yes in bold):90283}  History of preeclampsia, eclampsia, or HELLP syndrome: {yes no (yes in bold):15491}  History of diabetes: {yes no (yes in bold):71298}  History of lupus,sjogrens syndrome, kidney disease {yes no (yes in bold):21882}    Thyroid: if yes order TSH with reflex T4  History of thyroid disease: {yes  no (yes in bold):25873}    Bleeding Disorder or Hx of DVT - patient or first degree relative with history of. Order the following if not done previously.   (Factor V, antithrombin III, prothrombin gene mutation, protein C and S Ag, lupus anticoagulant, anticardiolipin, beta-2 glycoprotein):   {yes no (yes in bold):83256}    OB/GYN:  History of abnormal pap smear: {yes no (yes in bold):13450}       Date of last pap smear: Not on file  History of HPV: {yes no (yes in bold):57698}  History of Herpes/HSV: {yes no (yes in bold):49038}  History of other STI: (gonorrhea, chlamydia, trich) {yes no (yes in bold):41145}  History of prior : {yes no (yes in bold):97459}  History of prior : {yes no (yes in bold):35238}  History of  delivery prior to 36 weeks 6 days: {yes no (yes in bold):89309}  History of blood transfusion: {yes no (yes in bold):85944}  Ok for blood transfusion: {yes no (yes in bold):08028}    Substance screening-   History of tobacco use {yes no (yes in bold):76153}  Currently using tobacco {yes no (yes in bold):23484}  Substance Use Screen Level:  {substance screen:56563}    MRSA Screening:   Does the pt have a hx of MRSA? {yes no (yes in bold):55802}    Mental Health:  Hx of/or current dx of depression: {yes no (yes in bold):66071}  Hx of/or current dx of anxiety: {yes no (yes in bold):77298}  Medications: {yes no (yes in bold):97354} ***  EPDS Screen:  {Positive/Ne} / score: ***    Dental Health:  Patient has seen a dentist in the past 6 months: {yes no (yes in bold):14304}    Immunizations:  Influenza vaccine given this season: {YES/NO-:58825}   Discussed Tdap vaccine:  YES  Discussed COVID Vaccine:  YES  History of Varicella or Vaccination {vaccine, chickenpox:66083}    Genetic/MFM:  Do you or your partner have a history of any of the following in yourselves or first degree relatives?  Cystic fibrosis: {yes no (yes in bold):57214}  Spinal muscular atrophy: {yes no (yes in  bold):95511}  Hemoglobinopathy/Sickle Cell/Thalassemia: {yes no (yes in bold):82976}  Fragile X Intellectual Disability: {yes no (yes in bold):28813}    If yes, discuss Carrier Screening and recommend consultation with [facility]/Genetic Counseling and place specific [facility] Referral placed    Discussed Carrier Screening being completed once in a lifetime as a standard of care lab test. Place orders for Cystic Fibrosis Gene Test (DDB282) and Spinal Muscular Atrophy DNA (DRZ8080).  Patient was informed that prior authorization needs to be completed for these tests and this may take 7-10 business days.  Patient {DOES/DOES NOT:57927} desire testing for {testing-CF/SMA:41024}.    ACOG Patient Education {testing-CF/SMA:67102} prenatal screening given.    Appointment for Nuchal Translucency Ultrasound at [facility] {referral/scheduled:86006}    Interview education:  [facility] Pregnancy Essentials Book reviewed, discussed and attached to their AVS: {YES/NO-:20348}     Nurse/Family Partnership-patient may qualify {YES/NO-:85236}; referral placed {YES/NO-:42738}     Prenatal lab work scripts: {YES/NO-:88712}    Extra labs ordered: {OB LABS:21475}    Aspirin/Preeclampsia Screen    Risk Level Risk Factor Recommendation   LOW Prior Uncomplicated full-term delivery {yes no (yes in bold):25640} No Aspirin recommendation        MODERATE Nulliparity {yes no (yes in bold):12916} Recommend low-dose aspirin if     BMI>30 {yes no (yes in bold):45219} 2 or more moderate risk factors    Family History Preeclampsia (mother/sister) {yes no (yes in bold):10775}     35yr old or greater {yes no (yes in bold):95130}     Black Race, Concern for SDOH/Low Socioeconomic {yes no (yes in bold):13936}     IVF Pregnancy  {yes no (yes in bold):68382}     Personal History Risks (low birth weight, prior adverse preg outcome, >10yr preg interval) {yes no (yes in bold):96262}         HIGH History of Preeclampsia {yes no (yes in bold):65203} Recommend low-dose aspirin if      Multifetal gestation {yes no (yes in bold):21893} 1 or more high risk factors    Chronic HTN {yes no (yes in bold):14025}     Type 1 or 2 Diabetes {yes no (yes in bold):41698}     Renal Disease {yes no (yes in bold):54431}     Autoimmune Disease  {yes no (yes in bold):57929}      Contraindications to ASA therapy:  NSAID/ ASA allergy: {yes no (yes in bold):43380}  Nasal polyps: {yes no (yes in bold):66064}  Asthma with history of ASA induced bronchospasm: {yes no (yes in bold):18810}    Relative contraindications:  History of GI bleed: {yes no (yes in bold):52517}  Active peptic ulcer disease: {yes no (yes in bold):56287}  Severe hepatic dysfunction: {yes no (yes in bold):59391}    Patient {DOES/DOES NOT:71420} meet recommendation to take ASA 162mg during this pregnancy from 12-36 wks to lower her risk of preeclampsia.  {Instructions given.:53959}    The patient has a history now or in prior pregnancy notable for: {pregnancy complications:22074}      Details that I feel the provider should be aware of: ***    PN1 visit scheduled. The patient was oriented to our practice, the navigator role, reviewed delivering physicians and {delivery locations:69036} campus for delivery. All questions were answered.    Interviewed by: Joyce Boo RN

## 2025-01-08 ENCOUNTER — INITIAL PRENATAL (OUTPATIENT)
Age: 30
End: 2025-01-08

## 2025-01-08 ENCOUNTER — TELEPHONE (OUTPATIENT)
Age: 30
End: 2025-01-08

## 2025-01-08 VITALS — HEIGHT: 63 IN | BODY MASS INDEX: 21.19 KG/M2 | WEIGHT: 119.6 LBS

## 2025-01-08 DIAGNOSIS — Z31.430 ENCOUNTER OF FEMALE FOR TESTING FOR GENETIC DISEASE CARRIER STATUS FOR PROCREATIVE MANAGEMENT: ICD-10-CM

## 2025-01-08 DIAGNOSIS — Z34.81 MULTIGRAVIDA IN FIRST TRIMESTER: Primary | ICD-10-CM

## 2025-01-08 PROCEDURE — OBC

## 2025-01-08 NOTE — PROGRESS NOTES
Telemedicine Consent  The patient was identified by name and date of birth and was informed that this is a virtual visit being conducted through a secure, HIPAA-complaint platform. I am in a private office space with the door closed. Patient acknowledged understanding of privacy.  She agrees to proceed and is aware that she may discontinue the visit at any time.    OB INTAKE INTERVIEW 2025  Patient is 29 y.o. who presents for OB intake at 12w3d.  The father of her baby (Luis Penny) is involved in the pregnancy.      Patient's last menstrual period was 10/08/2024 (exact date).  Ultrasound: Measured 7 weeks 3 days on 24   Estimated Date of Delivery: 25 confirmed by dating ultrasound. 7 and 9 week US    Signs/Symptoms of Pregnancy  Current pregnancy symptoms: fatigue  Headaches: no  Cramping: YES - mild occasional  Spotting: no  PICA cravings: no    Diabetes  Body mass index is 21.19 kg/m².  History of GDM: no  BMI >35 no  History of PCOS or current metformin use: no  History of LGA/macrosomic infant (4000g/9lbs): no    BMI>30 no  AMA: no  First degree relative with type 2 diabetes: no  History of chronic HTN, hyperlipidemia, elevated A1C: no  High risk race (, , ,  or ): no    Hypertension  History of of chronic HTN: no  History of gestational HTN: no  History of preeclampsia, eclampsia, or HELLP syndrome: no  History of diabetes: no  History of lupus,sjogrens syndrome, kidney disease no    Thyroid  History of thyroid disease: no    Bleeding Disorder or Hx of DVT (Factor V, antithrombin III, prothrombin gene mutation, protein C and S Ag, lupus anticoagulant, anticardiolipin, beta-2 glycoprotein): no    OB/GYN:  History of abnormal pap smear: no       Date of last pap smear: 2022  History of HPV: no  History of Herpes/HSV: no  History of other STI: (gonorrhea, chlamydia, trich) no  History of prior : YES  History of prior  : no  History of  delivery prior to 36 weeks 6 days: no  History of blood transfusion: no  Ok for blood transfusion: YES    Substance screening-   History of tobacco use no  Currently using tobacco no  Substance Use Screen Level:  No Risk    MRSA Screening:   Does the pt have a hx of MRSA? no    Mental Health:  Hx of/or current dx of depression: no  Hx of/or current dx of anxiety: no  Medications: no   EPDS Screen:  Negative / score: 2    Immunizations:  Influenza vaccine given this season: YES - Received in October  Discussed Tdap vaccine:  YES  Discussed COVID Vaccine:  YES  History of Varicella or Vaccination had chicken pox    Genetic/M:  Do you or your partner have a history of any of the following in yourselves or first degree relatives?  Cystic fibrosis: no  Spinal muscular atrophy: no  Hemoglobinopathy/Sickle Cell/Thalassemia: no  Fragile X Intellectual Disability: no    Patient does not desire testing for Cystic Fibrosis and Spinal Muscular Atrophy at this time. ACOG Patient Education Cystic Fibrosis and Spinal Muscular Atrophy prenatal screening mailed to patient.    Appointment for Nuchal Translucency Ultrasound at [facility] is scheduled for .    Interview education:  [facility] Pregnancy Essentials Book reviewed, discussed and attached to their AVS: YES     Prenatal lab work scripts: YES    Extra labs ordered: no    Aspirin/Preeclampsia Screen    Risk Level Risk Factor Recommendation   LOW Prior Uncomplicated full-term delivery yes No Aspirin recommendation        MODERATE Nulliparity no Recommend low-dose aspirin if     BMI>30 no 2 or more moderate risk factors    Family History Preeclampsia (mother/sister) no     35yr old or greater no     Black Race, Concern for SDOH/Low Socioeconomic no     IVF Pregnancy  no     Personal History Risks (low birth weight, prior adverse preg outcome, >10yr preg interval) no         HIGH History of Preeclampsia no Recommend low-dose aspirin if      Multifetal gestation no 1 or more high risk factors    Chronic HTN no     Type 1 or 2 Diabetes no     Renal Disease no     Autoimmune Disease  no      Contraindications to ASA therapy:  NSAID/ ASA allergy: no  Nasal polyps: no  Asthma with history of ASA induced bronchospasm: no    Relative contraindications:  History of GI bleed: no  Active peptic ulcer disease: no  Severe hepatic dysfunction: no    Patient does not meet recommendation to take ASA 162mg during this pregnancy from 12-36 wks to lower her risk of preeclampsia.      The patient has a history now or in prior pregnancy notable for: No current or previous pregnancy complications      Details that I feel the provider should be aware of: Ana Luisa is currently 12w3d. Patient c/o fatigue and occasional mild cramping. Denies any spotting. Bleeding precautions reviewed due to patient's Rh negative blood type. Safe medications to take during pregnancy and warning signs reviewed. Patient with h/o  in . Prenatal panel ordered. Patient has NT scheduled with MFM on .    PN1 visit scheduled . The patient was oriented to our practice, the navigator role, reviewed delivering physicians and [facility] for delivery. All questions were answered.    Interviewed by: Yue Sharif RN

## 2025-01-11 PROBLEM — O09.899 SHORT INTERVAL BETWEEN PREGNANCIES COMPLICATING PREGNANCY, ANTEPARTUM: Status: ACTIVE | Noted: 2025-01-11

## 2025-01-11 PROBLEM — Z80.3 FAMILY HISTORY OF BREAST CANCER: Status: ACTIVE | Noted: 2025-01-11

## 2025-01-13 ENCOUNTER — APPOINTMENT (OUTPATIENT)
Dept: LAB | Facility: HOSPITAL | Age: 30
End: 2025-01-13
Payer: COMMERCIAL

## 2025-01-13 DIAGNOSIS — Z31.430 ENCOUNTER OF FEMALE FOR TESTING FOR GENETIC DISEASE CARRIER STATUS FOR PROCREATIVE MANAGEMENT: ICD-10-CM

## 2025-01-13 DIAGNOSIS — Z34.81 MULTIGRAVIDA IN FIRST TRIMESTER: ICD-10-CM

## 2025-01-13 LAB
ABO GROUP BLD: NORMAL
BASOPHILS # BLD AUTO: 0.02 THOUSANDS/ΜL (ref 0–0.1)
BASOPHILS NFR BLD AUTO: 0 % (ref 0–1)
BILIRUB UR QL STRIP: NEGATIVE
BLD GP AB SCN SERPL QL: NEGATIVE
CLARITY UR: CLEAR
COLOR UR: NORMAL
EOSINOPHIL # BLD AUTO: 0.02 THOUSAND/ΜL (ref 0–0.61)
EOSINOPHIL NFR BLD AUTO: 0 % (ref 0–6)
ERYTHROCYTE [DISTWIDTH] IN BLOOD BY AUTOMATED COUNT: 12.7 % (ref 11.6–15.1)
GLUCOSE UR STRIP-MCNC: NEGATIVE MG/DL
HCT VFR BLD AUTO: 38.7 % (ref 34.8–46.1)
HGB BLD-MCNC: 13.1 G/DL (ref 11.5–15.4)
HGB UR QL STRIP.AUTO: NEGATIVE
IMM GRANULOCYTES # BLD AUTO: 0.03 THOUSAND/UL (ref 0–0.2)
IMM GRANULOCYTES NFR BLD AUTO: 1 % (ref 0–2)
KETONES UR STRIP-MCNC: NEGATIVE MG/DL
LEUKOCYTE ESTERASE UR QL STRIP: NEGATIVE
LYMPHOCYTES # BLD AUTO: 1.65 THOUSANDS/ΜL (ref 0.6–4.47)
LYMPHOCYTES NFR BLD AUTO: 30 % (ref 14–44)
MCH RBC QN AUTO: 30 PG (ref 26.8–34.3)
MCHC RBC AUTO-ENTMCNC: 33.9 G/DL (ref 31.4–37.4)
MCV RBC AUTO: 89 FL (ref 82–98)
MONOCYTES # BLD AUTO: 0.35 THOUSAND/ΜL (ref 0.17–1.22)
MONOCYTES NFR BLD AUTO: 6 % (ref 4–12)
NEUTROPHILS # BLD AUTO: 3.37 THOUSANDS/ΜL (ref 1.85–7.62)
NEUTS SEG NFR BLD AUTO: 63 % (ref 43–75)
NITRITE UR QL STRIP: NEGATIVE
NRBC BLD AUTO-RTO: 0 /100 WBCS
PH UR STRIP.AUTO: 7 [PH]
PLATELET # BLD AUTO: 178 THOUSANDS/UL (ref 149–390)
PMV BLD AUTO: 10.3 FL (ref 8.9–12.7)
PROT UR STRIP-MCNC: NEGATIVE MG/DL
RBC # BLD AUTO: 4.36 MILLION/UL (ref 3.81–5.12)
RH BLD: NEGATIVE
RUBV IGG SERPL IA-ACNC: 33 IU/ML
SP GR UR STRIP.AUTO: 1.01 (ref 1–1.04)
SPECIMEN EXPIRATION DATE: NORMAL
UROBILINOGEN UA: NEGATIVE MG/DL
WBC # BLD AUTO: 5.44 THOUSAND/UL (ref 4.31–10.16)

## 2025-01-13 PROCEDURE — 87340 HEPATITIS B SURFACE AG IA: CPT

## 2025-01-13 PROCEDURE — 86803 HEPATITIS C AB TEST: CPT

## 2025-01-13 PROCEDURE — 86762 RUBELLA ANTIBODY: CPT

## 2025-01-13 PROCEDURE — 87086 URINE CULTURE/COLONY COUNT: CPT

## 2025-01-13 PROCEDURE — 86900 BLOOD TYPING SEROLOGIC ABO: CPT

## 2025-01-13 PROCEDURE — 86850 RBC ANTIBODY SCREEN: CPT

## 2025-01-13 PROCEDURE — 83020 HEMOGLOBIN ELECTROPHORESIS: CPT

## 2025-01-13 PROCEDURE — 36415 COLL VENOUS BLD VENIPUNCTURE: CPT

## 2025-01-13 PROCEDURE — 86901 BLOOD TYPING SEROLOGIC RH(D): CPT

## 2025-01-13 PROCEDURE — 86780 TREPONEMA PALLIDUM: CPT

## 2025-01-13 PROCEDURE — 86706 HEP B SURFACE ANTIBODY: CPT

## 2025-01-13 PROCEDURE — 87389 HIV-1 AG W/HIV-1&-2 AB AG IA: CPT

## 2025-01-13 PROCEDURE — 85025 COMPLETE CBC W/AUTO DIFF WBC: CPT

## 2025-01-14 ENCOUNTER — ROUTINE PRENATAL (OUTPATIENT)
Age: 30
End: 2025-01-14
Payer: COMMERCIAL

## 2025-01-14 VITALS
HEART RATE: 82 BPM | SYSTOLIC BLOOD PRESSURE: 96 MMHG | HEIGHT: 66 IN | WEIGHT: 124.6 LBS | BODY MASS INDEX: 20.03 KG/M2 | DIASTOLIC BLOOD PRESSURE: 58 MMHG

## 2025-01-14 DIAGNOSIS — Z36.9 ENCOUNTER FOR ANTENATAL SCREENING OF MOTHER: ICD-10-CM

## 2025-01-14 DIAGNOSIS — Z13.79 GENETIC SCREENING: ICD-10-CM

## 2025-01-14 DIAGNOSIS — Z80.3 FAMILY HISTORY OF BREAST CANCER: ICD-10-CM

## 2025-01-14 DIAGNOSIS — O09.899 SHORT INTERVAL BETWEEN PREGNANCIES COMPLICATING PREGNANCY, ANTEPARTUM: ICD-10-CM

## 2025-01-14 DIAGNOSIS — Z3A.13 13 WEEKS GESTATION OF PREGNANCY: ICD-10-CM

## 2025-01-14 DIAGNOSIS — Z36.82 ENCOUNTER FOR (NT) NUCHAL TRANSLUCENCY SCAN: Primary | ICD-10-CM

## 2025-01-14 DIAGNOSIS — Z33.1 INCIDENTAL PREGNANCY: ICD-10-CM

## 2025-01-14 PROBLEM — Z3A.12 12 WEEKS GESTATION OF PREGNANCY: Status: ACTIVE | Noted: 2025-01-14

## 2025-01-14 LAB
BACTERIA UR CULT: NORMAL
HBV SURFACE AB SER-ACNC: >500 MIU/ML
HBV SURFACE AG SER QL: NORMAL
HCV AB SER QL: NORMAL
HIV 1+2 AB+HIV1 P24 AG SERPL QL IA: NORMAL
HIV 2 AB SERPL QL IA: NORMAL
HIV1 AB SERPL QL IA: NORMAL
HIV1 P24 AG SERPL QL IA: NORMAL
TREPONEMA PALLIDUM IGG+IGM AB [PRESENCE] IN SERUM OR PLASMA BY IMMUNOASSAY: NORMAL

## 2025-01-14 PROCEDURE — 76813 OB US NUCHAL MEAS 1 GEST: CPT | Performed by: OBSTETRICS & GYNECOLOGY

## 2025-01-14 PROCEDURE — 99243 OFF/OP CNSLTJ NEW/EST LOW 30: CPT

## 2025-01-14 PROCEDURE — 36415 COLL VENOUS BLD VENIPUNCTURE: CPT | Performed by: OBSTETRICS & GYNECOLOGY

## 2025-01-14 NOTE — LETTER
"2025     Jossy Lopez MD  [address] [address]    Patient: Ana Luisa Penny   YOB: 1995   Date of Visit: 2025       Dear Dr. Lopez:    Thank you for referring Ana Luisa Penny to me for evaluation. Below are my notes for this consultation.    If you have questions, please do not hesitate to call me. I look forward to following your patient along with you.         Sincerely,        Mariah Hughes MD        CC: No Recipients    Mariah Hughes MD  2025  4:36 PM  Sign when Signing Visit  [facility]: Ms. Penny was seen today at 13w2d for nuchal translucency ultrasound.  See ultrasound report under \"OB Procedures\" tab.      Our recommendations are as follows:  We reviewed the availability of genetic screening, as well as diagnostic testing, which are available to all pregnant women. We reviewed limitations, risks, and benefits of screening and testing. She elected to proceed with Non Invasive Prenatal Screening (NIPS), with fetal sex chromosome analysis. MSAFP screening should be ordered through your office at 15-20 weeks gestation, and completed prior to fetal anatomic survey. She does not wish to pursue diagnostic testing at this time. A detailed anatomic survey as well as transvaginal cervical length screening are recommended between 20-22 weeks gestation.    Short interval between pregnancies (< 18 months from end of one pregnancy to beginning of next)  is associated with increased risk for  birth and low birthweight. A third trimester growth ultrasound is recommended.    There was slight discrepancy concerning Ana Luisa's due date and her early viability ultrasounds. Ana Luisa is certain that the first day of her last period was on 24. Before this, she had regular periods. Though this was the first cycle she experienced since having her miscarriage in August. We agree with dating " her pregnancy by ultrasound in this case as was done by her OB/GYN. EDC is 25 by her 7 week ultrasound performed 25. Today's ultrasound aligns with established due date.     Both Ana Luisa's maternal and paternal grandmother's had breast cancer. Her paternal grandmother passed away from it. She doesn't believe either of them ever had genetic testing done. Ana Luisa herself has never had genetic testing done either. Referral to [facility] Cancer Risk and Genetics Program can be considered if desired. She can call to schedule an appointment at her convenience by calling [phone] ([phone]).    There are reassuring pregnancy outcomes regarding the vaccination of Flu, COVID and RSV (third trimester) vaccines found by the American College of Obstetricians and Gynecologists and the Society for Maternal-Fetal Medicine. There are increased severity of infections and resultant maternal and fetal complications can arise with a severe infection including  labor.  Vaccines have been found to generate  antibodies in pregnant and lactating women similar to that observed in non-pregnant women. Vaccine-induced antibody levels were significantly greater than the levels found in response to natural infection. Immune transfer of these antibodies to neonates is found to occur via the placenta and breast milk.    Please don't hesitate to contact our office with any concerns or questions.    -Mariah Hughes MD

## 2025-01-14 NOTE — PROGRESS NOTES
Patient chose to have [facility] [id number] Non-Invasive Prenatal Screen [id number] PLUS w/ SCA, WITH fetal sex.  Patient choose to be billed through insurance.     Patient given brochure and is aware [facility] will contact patient's insurance and coordinate coverage.  Provided [facility] contact information. General inquiries [phone], Cost estimates [phone] and [facility] Billing [phone]. Website [email or web address].     Blood collection tubes labeled with patient identifiers (name, medical record number, and date of birth).     Filled out [facility] order form. Patient chose to have blood drawn in our office at time of visit. NIPS was drawn from right arm with a butterfly needle by Lb Person-WILFRED.       If patient chose to have blood work drawn at a [facility] lab we requested patient notify MFM (via phone call or [employer] message) when blood collected so office can follow up on results.       Maternal Fetal Medicine will have results in approximately 5-7 business days and will call patient or notify via [employer].  Patient aware viewing lab result online will reveal fetal sex if ordered.    Patient verbalized understanding of all instructions and no questions at this time.

## 2025-01-14 NOTE — PROGRESS NOTES
"[facility]  Center: Ms. Penny was seen today at 13w2d for nuchal translucency ultrasound.  See ultrasound report under \"OB Procedures\" tab.      Our recommendations are as follows:  We reviewed the availability of genetic screening, as well as diagnostic testing, which are available to all pregnant women. We reviewed limitations, risks, and benefits of screening and testing. She elected to proceed with Non Invasive Prenatal Screening (NIPS), with fetal sex chromosome analysis. MSAFP screening should be ordered through your office at 15-20 weeks gestation, and completed prior to fetal anatomic survey. She does not wish to pursue diagnostic testing at this time. A detailed anatomic survey as well as transvaginal cervical length screening are recommended between 20-22 weeks gestation.    Short interval between pregnancies (< 18 months from end of one pregnancy to beginning of next)  is associated with increased risk for  birth and low birthweight. A third trimester growth ultrasound is recommended.    There was slight discrepancy concerning Ana Luisa's due date and her early viability ultrasounds. Ana Luisa is certain that the first day of her last period was on 24. Before this, she had regular periods. Though this was the first cycle she experienced since having her miscarriage in August. We agree with dating her pregnancy by ultrasound in this case as was done by her OB/GYN. EDC is 25 by her 7 week ultrasound performed 25. Today's ultrasound aligns with established due date.     Both Ana Luisa's maternal and paternal grandmother's had breast cancer. Her paternal grandmother passed away from it. She doesn't believe either of them ever had genetic testing done. Ana Luisa herself has never had genetic testing done either. Referral to [facility] Cancer Risk and Genetics Program can be considered if desired. She can call to schedule an appointment at her convenience by calling " [phone] ([phone]).    There are reassuring pregnancy outcomes regarding the vaccination of Flu, COVID and RSV (third trimester) vaccines found by the American College of Obstetricians and Gynecologists and the Society for Maternal-Fetal Medicine. There are increased severity of infections and resultant maternal and fetal complications can arise with a severe infection including  labor.  Vaccines have been found to generate  antibodies in pregnant and lactating women similar to that observed in non-pregnant women. Vaccine-induced antibody levels were significantly greater than the levels found in response to natural infection. Immune transfer of these antibodies to neonates is found to occur via the placenta and breast milk.    Please don't hesitate to contact our office with any concerns or questions.    -Mariah Hughes MD

## 2025-01-15 ENCOUNTER — RESULTS FOLLOW-UP (OUTPATIENT)
Dept: LABOR AND DELIVERY | Facility: HOSPITAL | Age: 30
End: 2025-01-15

## 2025-01-15 LAB
HGB A MFR BLD: 2.9 % (ref 1.8–3.2)
HGB A MFR BLD: 97.1 % (ref 96.4–98.8)
HGB F MFR BLD: 0 % (ref 0–2)
HGB FRACT BLD-IMP: NORMAL
HGB S MFR BLD: 0 %

## 2025-01-16 ENCOUNTER — INITIAL PRENATAL (OUTPATIENT)
Age: 30
End: 2025-01-16
Payer: COMMERCIAL

## 2025-01-16 VITALS — SYSTOLIC BLOOD PRESSURE: 104 MMHG | DIASTOLIC BLOOD PRESSURE: 60 MMHG | BODY MASS INDEX: 19.89 KG/M2 | WEIGHT: 123.2 LBS

## 2025-01-16 DIAGNOSIS — Z36.9 ANTENATAL SCREENING ENCOUNTER: ICD-10-CM

## 2025-01-16 DIAGNOSIS — Z11.3 SCREENING FOR STD (SEXUALLY TRANSMITTED DISEASE): ICD-10-CM

## 2025-01-16 DIAGNOSIS — Z34.81 MULTIGRAVIDA IN FIRST TRIMESTER: Primary | ICD-10-CM

## 2025-01-16 DIAGNOSIS — Z12.4 SCREENING FOR CERVICAL CANCER: ICD-10-CM

## 2025-01-16 DIAGNOSIS — Z33.1 PREGNANT STATE, INCIDENTAL: ICD-10-CM

## 2025-01-16 LAB
SL AMB  POCT GLUCOSE, UA: NORMAL
SL AMB POCT URINE PROTEIN: NORMAL

## 2025-01-16 PROCEDURE — 87491 CHLMYD TRACH DNA AMP PROBE: CPT | Performed by: OBSTETRICS & GYNECOLOGY

## 2025-01-16 PROCEDURE — 81002 URINALYSIS NONAUTO W/O SCOPE: CPT | Performed by: OBSTETRICS & GYNECOLOGY

## 2025-01-16 PROCEDURE — G0476 HPV COMBO ASSAY CA SCREEN: HCPCS | Performed by: OBSTETRICS & GYNECOLOGY

## 2025-01-16 PROCEDURE — 87591 N.GONORRHOEAE DNA AMP PROB: CPT | Performed by: OBSTETRICS & GYNECOLOGY

## 2025-01-16 PROCEDURE — PNV: Performed by: OBSTETRICS & GYNECOLOGY

## 2025-01-16 PROCEDURE — G0145 SCR C/V CYTO,THINLAYER,RESCR: HCPCS | Performed by: OBSTETRICS & GYNECOLOGY

## 2025-01-16 NOTE — PROGRESS NOTES
1ST OB VISIT  Pn1 Labs: Completed   [id number]: collected   Nuchal completed 1/14/25 Anterior Placenta  G:  3     P:  1  LMP: 10/8/24   LAINA: 7/20/25  Last Annual Visit: 2022, due today  Last Pap: 3/24/22  NILM  Pap IS indicated today  GC/CH collected today  Blood Type: O NEGATIVE (will need Rhogam at 28 wks)   Up to date Flu vaccine  Blue Folder given TODAY    SAB 9/2024 -Rhogam administered 9/10/24    Denies vaginal bleeding , spotting, pain or cramping  Improvement with Nausea and vomiting

## 2025-01-16 NOTE — PROGRESS NOTES
Assessment:     Pregnancy at 13 and 4/7 weeks    Rh negative    Plan:    Initial labs drawn.  Prenatal vitamins.  Problem list reviewed and updated.  NT sono normal; NIPT pending  AFP order given.  Level II scheduled.  Rhogam @ 28 weeks unless bleeding occurs prior to then.  Follow up in 4 weeks.  Greater than 50% of 30 min visit spent on counseling and coordination of care.         Subjective     Ana Luisa Penny is being seen today for her first obstetrical visit.  This is a planned pregnancy. She is at 13w4d gestation. Her obstetrical history is benign. Relationship with FOB: spouse, living together. Patient does intend to breast feed. Pregnancy history fully reviewed. Nausea improved.  No VB.    Menstrual History:  OB History          3    Para   1    Term   1       0    AB   1    Living   1         SAB   1    IAB   0    Ectopic   0    Multiple   0    Live Births   1                Patient's last menstrual period was 10/08/2024 (exact date).     No past medical history on file.    No past surgical history on file.    Current Outpatient Medications on File Prior to Visit   Medication Sig    MELATONIN PO Take 2.5 mg by mouth as needed    Prenatal-FeFum-FA-DHA w/o A (PRENATAL + DHA PO) Take by mouth     No current facility-administered medications on file prior to visit.       No Known Allergies    Social History     Tobacco Use    Smoking status: Never    Smokeless tobacco: Never   Vaping Use    Vaping status: Never Used   Substance Use Topics    Alcohol use: Not Currently     Alcohol/week: 3.0 standard drinks of alcohol     Types: 3 Glasses of wine per week     Comment: socially    Drug use: Never       Family History   Problem Relation Age of Onset    Hypertension Mother     No Known Problems Father     No Known Problems Sister     No Known Problems Brother     Breast cancer Maternal Grandmother     Pancreatic cancer Maternal Grandmother     Cancer Maternal Grandmother     Heart attack Maternal  Grandfather     Breast cancer Paternal Grandmother     Stroke Paternal Grandfather     Heart attack Paternal Grandfather     Colon cancer Neg Hx     Ovarian cancer Neg Hx        The following portions of the patient's history were reviewed and updated as appropriate: allergies, current medications, past family history, past medical history, past social history, past surgical history, and problem list.    Review of Systems  Pertinent items are noted in HPI.      Objective    Vitals:    01/16/25 0836   BP: 104/60        See prenatal physical

## 2025-01-17 LAB
HPV HR 12 DNA CVX QL NAA+PROBE: NEGATIVE
HPV16 DNA CVX QL NAA+PROBE: NEGATIVE
HPV18 DNA CVX QL NAA+PROBE: NEGATIVE

## 2025-01-18 ENCOUNTER — RESULTS FOLLOW-UP (OUTPATIENT)
Dept: PERINATAL CARE | Facility: OTHER | Age: 30
End: 2025-01-18

## 2025-01-18 LAB
CFDNA.FET/CFDNA.TOTAL SFR FETUS: NORMAL %
CITATION REF LAB TEST: NORMAL
FET 13+18+21+X+Y ANEUP PLAS.CFDNA: NEGATIVE
FET CHR 21 TS PLAS.CFDNA QL: NEGATIVE
FET CHR 21 TS PLAS.CFDNA QL: NEGATIVE
FET MS X RISK WBC.DNA+CFDNA QL: NOT DETECTED
FET SEX PLAS.CFDNA DOSAGE CFDNA: NORMAL
FET TS 13 RISK PLAS.CFDNA QL: NEGATIVE
FET X + Y ANEUP RISK PLAS.CFDNA SEQ-IMP: NOT DETECTED
GA EST FROM CONCEPTION DATE: NORMAL D
GESTATIONAL AGE > 9:: YES
LAB DIRECTOR NAME PROVIDER: NORMAL
LAB DIRECTOR NAME PROVIDER: NORMAL
LABORATORY COMMENT REPORT: NORMAL
LIMITATIONS OF THE TEST: NORMAL
NEGATIVE PREDICTIVE VALUE: NORMAL
PERFORMANCE CHARACTERISTICS: NORMAL
POSITIVE PREDICTIVE VALUE: NORMAL
REF LAB TEST METHOD: NORMAL
SL AMB NOTE:: NORMAL
TEST PERFORMANCE INFO SPEC: NORMAL

## 2025-01-20 ENCOUNTER — RESULTS FOLLOW-UP (OUTPATIENT)
Age: 30
End: 2025-01-20

## 2025-01-20 LAB
C TRACH DNA SPEC QL NAA+PROBE: NEGATIVE
N GONORRHOEA DNA SPEC QL NAA+PROBE: NEGATIVE

## 2025-01-22 LAB
LAB AP GYN PRIMARY INTERPRETATION: NORMAL
LAB AP LMP: NORMAL
Lab: NORMAL

## 2025-02-07 ENCOUNTER — ROUTINE PRENATAL (OUTPATIENT)
Age: 30
End: 2025-02-07
Payer: COMMERCIAL

## 2025-02-07 VITALS — WEIGHT: 126.8 LBS | SYSTOLIC BLOOD PRESSURE: 110 MMHG | BODY MASS INDEX: 20.47 KG/M2 | DIASTOLIC BLOOD PRESSURE: 62 MMHG

## 2025-02-07 DIAGNOSIS — Z34.82 PRENATAL CARE, SUBSEQUENT PREGNANCY, SECOND TRIMESTER: Primary | ICD-10-CM

## 2025-02-07 LAB
SL AMB  POCT GLUCOSE, UA: NORMAL
SL AMB POCT URINE PROTEIN: NORMAL

## 2025-02-07 PROCEDURE — 81002 URINALYSIS NONAUTO W/O SCOPE: CPT | Performed by: OBSTETRICS & GYNECOLOGY

## 2025-02-07 PROCEDURE — PNV: Performed by: OBSTETRICS & GYNECOLOGY

## 2025-02-07 NOTE — PROGRESS NOTES
+ flutter  No cramping or spotting  NIPT low-risk; it's a boy!  Patient told AFP may not be covered  Level II scheduled

## 2025-02-20 ENCOUNTER — TELEPHONE (OUTPATIENT)
Age: 30
End: 2025-02-20

## 2025-02-21 ENCOUNTER — TELEPHONE (OUTPATIENT)
Age: 30
End: 2025-02-21

## 2025-02-21 NOTE — TELEPHONE ENCOUNTER
Attempted to contact patient for 2nd Trimester Check-in Call. Pt unavailable. [employer] msg was sent  2/18/25.  Left msg to reach out w/questions or concerns.

## 2025-03-04 ENCOUNTER — ROUTINE PRENATAL (OUTPATIENT)
Age: 30
End: 2025-03-04
Payer: COMMERCIAL

## 2025-03-04 VITALS
SYSTOLIC BLOOD PRESSURE: 94 MMHG | HEART RATE: 78 BPM | BODY MASS INDEX: 20.76 KG/M2 | WEIGHT: 129.2 LBS | HEIGHT: 66 IN | DIASTOLIC BLOOD PRESSURE: 56 MMHG

## 2025-03-04 DIAGNOSIS — Z3A.20 20 WEEKS GESTATION OF PREGNANCY: Primary | ICD-10-CM

## 2025-03-04 DIAGNOSIS — Q27.0 TWO VESSEL CORD: ICD-10-CM

## 2025-03-04 PROCEDURE — 76817 TRANSVAGINAL US OBSTETRIC: CPT | Performed by: OBSTETRICS & GYNECOLOGY

## 2025-03-04 PROCEDURE — 99214 OFFICE O/P EST MOD 30 MIN: CPT | Performed by: OBSTETRICS & GYNECOLOGY

## 2025-03-04 PROCEDURE — 76811 OB US DETAILED SNGL FETUS: CPT | Performed by: OBSTETRICS & GYNECOLOGY

## 2025-03-04 NOTE — PROGRESS NOTES
A fetal ultrasound was completed. See Ob procedures in Epic for an interpretation and recommendations. Do not hesitate to contact us in [facility] if you have questions.    Tai Mao MD, MSCE  Maternal Fetal Medicine

## 2025-03-04 NOTE — PROGRESS NOTES
Ultrasound Probe Disinfection    A transvaginal ultrasound was performed.   Prior to use, disinfection was performed with High Level Disinfection Process ([employer]).  Probe serial number [id number]: [id number] was used.    Cathy Mustafa  03/04/25  8:00 AM

## 2025-03-04 NOTE — LETTER
March 4, 2025     Jossy Lopez MD  [address] [address]    Patient: Ana Luisa Penny   YOB: 1995   Date of Visit: 3/4/2025       Dear Dr. Lopez:    Thank you for referring Ana Luisa Penny to me for evaluation. Below are my notes for this consultation.    If you have questions, please do not hesitate to call me. I look forward to following your patient along with you.         Sincerely,        Tai Mao MD        CC: No Recipients    Tai Mao MD  3/4/2025 10:13 AM  Sign when Signing Visit  A fetal ultrasound was completed. See Ob procedures in Epic for an interpretation and recommendations. Do not hesitate to contact us in [facility] if you have questions.    Tai Mao MD, MSCE  Maternal Fetal Medicine

## 2025-03-06 ENCOUNTER — ROUTINE PRENATAL (OUTPATIENT)
Age: 30
End: 2025-03-06
Payer: COMMERCIAL

## 2025-03-06 VITALS — BODY MASS INDEX: 20.63 KG/M2 | DIASTOLIC BLOOD PRESSURE: 62 MMHG | WEIGHT: 127.8 LBS | SYSTOLIC BLOOD PRESSURE: 122 MMHG

## 2025-03-06 DIAGNOSIS — Z34.82 PRENATAL CARE, SUBSEQUENT PREGNANCY, SECOND TRIMESTER: Primary | ICD-10-CM

## 2025-03-06 PROBLEM — O09.899 SINGLE UMBILICAL ARTERY AFFECTING MANAGEMENT OF MOTHER, ANTEPARTUM, SINGLE GESTATION: Status: ACTIVE | Noted: 2025-03-06

## 2025-03-06 PROBLEM — Z3A.20 20 WEEKS GESTATION OF PREGNANCY: Status: ACTIVE | Noted: 2025-01-14

## 2025-03-06 LAB
SL AMB  POCT GLUCOSE, UA: NORMAL
SL AMB POCT URINE PROTEIN: NORMAL

## 2025-03-06 PROCEDURE — 81002 URINALYSIS NONAUTO W/O SCOPE: CPT | Performed by: OBSTETRICS & GYNECOLOGY

## 2025-03-06 PROCEDURE — PNV: Performed by: OBSTETRICS & GYNECOLOGY

## 2025-03-06 NOTE — PROGRESS NOTES
PN visit  20w3d   AFP ordered- going tomorrow  Level II complete (Posterior placenta)   Follow up growth scan at 28 wks.    Denies Leaking of Fluid, vaginal bleeding,contractions  +Fetal Movement     EPDS: 4

## 2025-03-07 ENCOUNTER — APPOINTMENT (OUTPATIENT)
Dept: LAB | Facility: HOSPITAL | Age: 30
End: 2025-03-07
Payer: COMMERCIAL

## 2025-03-07 DIAGNOSIS — Z33.1 PREGNANT STATE, INCIDENTAL: ICD-10-CM

## 2025-03-07 DIAGNOSIS — Z36.9 ANTENATAL SCREENING ENCOUNTER: ICD-10-CM

## 2025-03-07 PROCEDURE — 36415 COLL VENOUS BLD VENIPUNCTURE: CPT

## 2025-03-07 PROCEDURE — 82105 ALPHA-FETOPROTEIN SERUM: CPT

## 2025-03-09 LAB
2ND TRIMESTER 4 SCREEN SERPL-IMP: NORMAL
AFP ADJ MOM SERPL: 1.47
AFP INTERP AMN-IMP: NORMAL
AFP INTERP SERPL-IMP: NORMAL
AFP INTERP SERPL-IMP: NORMAL
AFP SERPL-MCNC: 104.6 NG/ML
AGE AT DELIVERY: 32.4 YR
GA METHOD: NORMAL
GA: 20.7 WEEKS
IDDM PATIENT QL: NO
MULTIPLE PREGNANCY: NO
NEURAL TUBE DEFECT RISK FETUS: 2965 %

## 2025-04-03 ENCOUNTER — ROUTINE PRENATAL (OUTPATIENT)
Age: 30
End: 2025-04-03
Payer: COMMERCIAL

## 2025-04-03 VITALS — SYSTOLIC BLOOD PRESSURE: 92 MMHG | BODY MASS INDEX: 22.08 KG/M2 | WEIGHT: 136.8 LBS | DIASTOLIC BLOOD PRESSURE: 62 MMHG

## 2025-04-03 DIAGNOSIS — Z11.3 SCREENING FOR STD (SEXUALLY TRANSMITTED DISEASE): ICD-10-CM

## 2025-04-03 DIAGNOSIS — Z34.82 PRENATAL CARE, SUBSEQUENT PREGNANCY, SECOND TRIMESTER: Primary | ICD-10-CM

## 2025-04-03 PROBLEM — Z3A.24 24 WEEKS GESTATION OF PREGNANCY: Status: ACTIVE | Noted: 2025-01-14

## 2025-04-03 LAB
SL AMB  POCT GLUCOSE, UA: NORMAL
SL AMB POCT URINE PROTEIN: NORMAL

## 2025-04-03 PROCEDURE — PNV: Performed by: OBSTETRICS & GYNECOLOGY

## 2025-04-03 PROCEDURE — 81002 URINALYSIS NONAUTO W/O SCOPE: CPT | Performed by: OBSTETRICS & GYNECOLOGY

## 2025-04-03 NOTE — PROGRESS NOTES
Good FM  No VB or cramping but some discomfort @ umbilicus  Fundus soft and non-tender  28 week lab order given  Growth hermilao 4/29/2025 (SUA)

## 2025-04-03 NOTE — PROGRESS NOTES
PN visit  24w4d  AFP completed  Level II complete (Posterior placenta)   Follow up growth scan at 28 wks.  28wk labs ordered today    Denies Leaking of Fluid, vaginal bleeding,contractions   Reports lower abdominal discomfort this morning  +Fetal Movement

## 2025-04-15 ENCOUNTER — RESULTS FOLLOW-UP (OUTPATIENT)
Age: 30
End: 2025-04-15

## 2025-04-15 ENCOUNTER — APPOINTMENT (OUTPATIENT)
Dept: LAB | Facility: HOSPITAL | Age: 30
End: 2025-04-15
Payer: COMMERCIAL

## 2025-04-15 DIAGNOSIS — Z11.3 SCREENING FOR STD (SEXUALLY TRANSMITTED DISEASE): ICD-10-CM

## 2025-04-15 DIAGNOSIS — Z34.82 PRENATAL CARE, SUBSEQUENT PREGNANCY, SECOND TRIMESTER: ICD-10-CM

## 2025-04-15 LAB
BASOPHILS # BLD AUTO: 0.03 THOUSANDS/ÂΜL (ref 0–0.1)
BASOPHILS NFR BLD AUTO: 0 % (ref 0–1)
EOSINOPHIL # BLD AUTO: 0.03 THOUSAND/ÂΜL (ref 0–0.61)
EOSINOPHIL NFR BLD AUTO: 0 % (ref 0–6)
ERYTHROCYTE [DISTWIDTH] IN BLOOD BY AUTOMATED COUNT: 12.3 % (ref 11.6–15.1)
GLUCOSE 1H P 50 G GLC PO SERPL-MCNC: 128 MG/DL (ref 70–134)
HCT VFR BLD AUTO: 37.3 % (ref 34.8–46.1)
HGB BLD-MCNC: 12.2 G/DL (ref 11.5–15.4)
IMM GRANULOCYTES # BLD AUTO: 0.11 THOUSAND/UL (ref 0–0.2)
IMM GRANULOCYTES NFR BLD AUTO: 1 % (ref 0–2)
LYMPHOCYTES # BLD AUTO: 1.64 THOUSANDS/ÂΜL (ref 0.6–4.47)
LYMPHOCYTES NFR BLD AUTO: 20 % (ref 14–44)
MCH RBC QN AUTO: 30 PG (ref 26.8–34.3)
MCHC RBC AUTO-ENTMCNC: 32.7 G/DL (ref 31.4–37.4)
MCV RBC AUTO: 92 FL (ref 82–98)
MONOCYTES # BLD AUTO: 0.36 THOUSAND/ÂΜL (ref 0.17–1.22)
MONOCYTES NFR BLD AUTO: 5 % (ref 4–12)
NEUTROPHILS # BLD AUTO: 5.9 THOUSANDS/ÂΜL (ref 1.85–7.62)
NEUTS SEG NFR BLD AUTO: 74 % (ref 43–75)
NRBC BLD AUTO-RTO: 0 /100 WBCS
PLATELET # BLD AUTO: 184 THOUSANDS/UL (ref 149–390)
PMV BLD AUTO: 10.3 FL (ref 8.9–12.7)
RBC # BLD AUTO: 4.07 MILLION/UL (ref 3.81–5.12)
WBC # BLD AUTO: 8.07 THOUSAND/UL (ref 4.31–10.16)

## 2025-04-15 PROCEDURE — 36415 COLL VENOUS BLD VENIPUNCTURE: CPT

## 2025-04-15 PROCEDURE — 85025 COMPLETE CBC W/AUTO DIFF WBC: CPT

## 2025-04-15 PROCEDURE — 86780 TREPONEMA PALLIDUM: CPT

## 2025-04-15 PROCEDURE — 82950 GLUCOSE TEST: CPT

## 2025-04-16 LAB — TREPONEMA PALLIDUM IGG+IGM AB [PRESENCE] IN SERUM OR PLASMA BY IMMUNOASSAY: NORMAL

## 2025-04-27 NOTE — PROGRESS NOTES
Please refer to the [facility] ultrasound report in Ob Procedures for additional information regarding today's visit

## 2025-04-28 NOTE — PATIENT INSTRUCTIONS
"Patient Education     Your baby's movement before birth   The Basics   Written by the doctors and editors at [facility]   When should I start feeling my baby move? -- It depends. Most people first feel their baby moving in the uterus between about 16 and 20 weeks of pregnancy. It might take longer to feel movement if this is your first pregnancy or if the placenta is in the front of your uterus.  What kinds of movements should I feel? -- When you first feel your baby move, it might feel like a gentle flutter in your belly. This is sometimes called \"quickening.\" As the baby grows, their movements will get stronger. You will probably feel them kicking, rolling, and stretching. Later in pregnancy, you might be able to see and feel the baby moving from the outside.  You might notice that your baby is more active at certain times of the day or night. Even before birth, babies have periods of being asleep and awake. When your baby is sleeping, you might notice that they do not move as much.  Should I keep track of my baby's movements? -- If your pregnancy is healthy, you probably do not need to count or record your baby's movements. Feeling regular movement is a good sign that the baby is doing well.  In some cases, your doctor or midwife might ask you to keep track of your baby's movements. If so, they will tell you how to do this and when to call them.  A change in your baby's movements does not always mean that there is a problem. But in some cases, it can be a sign that the baby is having trouble. If your doctor or midwife is concerned, they can do tests to check on the baby.  If I am asked to track movement, how should I do it? -- There are different ways of tracking your baby's movement. This is sometimes called \"kick counting.\"  Your doctor or midwife will tell you exactly what to track. For example, they might ask you to write down:   How long it takes to feel 10 kicks or movements   How many times your baby moves " in 1 hour  Many experts consider at least 10 movements in 2 hours to be a sign that the baby is doing well. But there is no specific cutoff for exactly how much movement is healthy or unhealthy. Some babies are more active than others, and some pregnant people feel movement more easily than others. The main goal of kick counting is to get to know your baby's normal patterns so you can tell if anything changes.  If you are doing kick counting:   Choose a time of day when your baby is usually active.   Find a quiet place where you will not be distracted.   Lie down on your side in a comfortable position.   Check the clock, or set a timer.   Each time you feel your baby move or kick, write down the time. Some people use a smartphone silva to keep track.   If your baby seems less active than usual, try moving around, eating a snack, and emptying your bladder. This can help wake the baby up if they are asleep.   Stop counting after you have felt 10 kicks, or after the length of time your doctor or midwife told you.  When should I call the doctor? -- Call your doctor or midwife for advice if:   You have concerns about your baby's movement.   Your baby is moving less than they normally do.   You notice a sudden change in the pattern of your baby's movements.   You have any other symptoms that worry you.  All topics are updated as new evidence becomes available and our peer review process is complete.  This topic retrieved from "[employer] on: Feb 26, 2024.  Topic 179154 Version 1.0  Release: 32.2.4 - C32.56  © 2024 UpToDate, Inc. and/or its affiliates. All rights reserved.  Consumer Information Use and Disclaimer   Disclaimer: This generalized information is a limited summary of diagnosis, treatment, and/or medication information. It is not meant to be comprehensive and should be used as a tool to help the user understand and/or assess potential diagnostic and treatment options. It does NOT include all information about  conditions, treatments, medications, side effects, or risks that may apply to a specific patient. It is not intended to be medical advice or a substitute for the medical advice, diagnosis, or treatment of a health care provider based on the health care provider's examination and assessment of a patient's specific and unique circumstances. Patients must speak with a health care provider for complete information about their health, medical questions, and treatment options, including any risks or benefits regarding use of medications. This information does not endorse any treatments or medications as safe, effective, or approved for treating a specific patient. UpToDate, Inc. and its affiliates disclaim any warranty or liability relating to this information or the use thereof.The use of this information is governed by the Terms of Use, available at https://www.[employer].com/en/know/clinical-effectiveness-terms. © UpToDate, Inc. and its affiliates and/or licensors. All rights reserved.  Copyright   ©  UpToDate, Inc. and/or its affiliates. All rights reserved.  Thank you for choosing us for your  care today.  If you have any questions about your ultrasound or care, please do not hesitate to contact us or your primary obstetrician.        Some general instructions for your pregnancy are:    Exercise: Aim for 150 minutes per week of regular exercise.  Walking is great!  Nutrition: Choose healthy sources of calcium, iron, and protein.  Avoid ultraprocessed foods and added sugar.  Learn about Preeclampsia: preeclampsia is a common, potentially serious high blood pressure complication in pregnancy.  A blood pressure of 140mmHg (systolic or top number) or 90mmHg (diastolic or bottom number) should be evaluated by your doctor.  Aspirin is sometimes prescribed in early pregnancy to prevent preeclampsia in women with risk factors - ask your obstetrician if you should be on this medication.  For more  resources, visit:  https://www.highriskpregnancyinfo.org/preeclampsia  If you smoke, please try to quit completely but also try to reduce your smoking by as much as possible (as soon as possible).  Do not vape.  Please also avoid cannabis products.  Other warning signs to watch out for in pregnancy or postpartum: chest pain, obstructed breathing or shortness of breath, seizures, thoughts of hurting yourself or your baby, bleeding, a painful or swollen leg, fever, or headache (see AWNN POST-BIRTH Warning Signs campaign).  If these happen call 911.  Itching is also not normal in pregnancy and if you experience this, especially over your hands and feet, potentially worse at night, notify your doctors.

## 2025-04-29 ENCOUNTER — ULTRASOUND (OUTPATIENT)
Age: 30
End: 2025-04-29
Attending: OBSTETRICS & GYNECOLOGY
Payer: COMMERCIAL

## 2025-04-29 VITALS
WEIGHT: 142.2 LBS | SYSTOLIC BLOOD PRESSURE: 118 MMHG | HEART RATE: 98 BPM | BODY MASS INDEX: 22.85 KG/M2 | DIASTOLIC BLOOD PRESSURE: 64 MMHG | HEIGHT: 66 IN

## 2025-04-29 DIAGNOSIS — O09.893 SHORT INTERVAL BETWEEN PREGNANCIES COMPLICATING PREGNANCY, ANTEPARTUM, THIRD TRIMESTER: ICD-10-CM

## 2025-04-29 DIAGNOSIS — O09.899 SINGLE UMBILICAL ARTERY AFFECTING MANAGEMENT OF MOTHER, ANTEPARTUM, SINGLE GESTATION: Primary | ICD-10-CM

## 2025-04-29 DIAGNOSIS — Z3A.28 28 WEEKS GESTATION OF PREGNANCY: ICD-10-CM

## 2025-04-29 PROCEDURE — 76816 OB US FOLLOW-UP PER FETUS: CPT | Performed by: OBSTETRICS & GYNECOLOGY

## 2025-04-29 PROCEDURE — 99213 OFFICE O/P EST LOW 20 MIN: CPT | Performed by: OBSTETRICS & GYNECOLOGY

## 2025-04-29 NOTE — LETTER
April 29, 2025     Greta Haines MD  [address] [facility] [address]    Patient: Ana Luisa Penny   YOB: 1995   Date of Visit: 4/29/2025       Dear Dr. Greta Haines MD:    Thank you for referring Ana Luisa Penny to me for evaluation. Below are my notes for this consultation.    If you have questions, please do not hesitate to call me. I look forward to following your patient along with you.         Sincerely,        Malachi Cedillo MD        CC: No Recipients    Malachi Cedillo MD  4/29/2025  9:50 AM  Sign when Signing Visit  Please refer to the [facility] ultrasound report in Ob Procedures for additional information regarding today's visit

## 2025-04-30 ENCOUNTER — TELEPHONE (OUTPATIENT)
Age: 30
End: 2025-04-30

## 2025-04-30 NOTE — TELEPHONE ENCOUNTER
Francisca from [facility] asking about diagnosis codes available for NIPT testing that was ordered on 1/14/25. No further questions.

## 2025-05-01 ENCOUNTER — ROUTINE PRENATAL (OUTPATIENT)
Age: 30
End: 2025-05-01

## 2025-05-01 VITALS — DIASTOLIC BLOOD PRESSURE: 64 MMHG | SYSTOLIC BLOOD PRESSURE: 102 MMHG | BODY MASS INDEX: 22.89 KG/M2 | WEIGHT: 141.8 LBS

## 2025-05-01 DIAGNOSIS — Z34.82 PRENATAL CARE, SUBSEQUENT PREGNANCY, SECOND TRIMESTER: Primary | ICD-10-CM

## 2025-05-01 DIAGNOSIS — Z29.13 NEED FOR RHOGAM DUE TO RH NEGATIVE MOTHER: ICD-10-CM

## 2025-05-01 LAB
SL AMB  POCT GLUCOSE, UA: NEGATIVE
SL AMB POCT URINE PROTEIN: NEGATIVE

## 2025-05-01 NOTE — PROGRESS NOTES
PN visit  28w4d  AFP completed  Level II complete (Posterior placenta)   Follow up growth scan completed 4/29/25  Growth scan scheduled for 36 wks- 5/27/25  28wk labs completed  Yellow folder and consent today  Plans to breastfeed    Denies Leaking of Fluid, vaginal bleeding,contractions   +Fetal Movement

## 2025-05-01 NOTE — PROGRESS NOTES
Routine PN visit.   Labs up to date.   Had growth - 71%, has follow up at 36wks.   Rhogam today.   Delivery consent today.   Baby is active, kick counts reviewed.

## 2025-05-15 ENCOUNTER — NURSE TRIAGE (OUTPATIENT)
Age: 30
End: 2025-05-15

## 2025-05-15 ENCOUNTER — ROUTINE PRENATAL (OUTPATIENT)
Age: 30
End: 2025-05-15
Payer: COMMERCIAL

## 2025-05-15 ENCOUNTER — HOSPITAL ENCOUNTER (OUTPATIENT)
Facility: HOSPITAL | Age: 30
Discharge: HOME | End: 2025-05-15
Attending: OBSTETRICS & GYNECOLOGY | Admitting: OBSTETRICS & GYNECOLOGY
Payer: COMMERCIAL

## 2025-05-15 ENCOUNTER — HOSPITAL ENCOUNTER (OUTPATIENT)
Facility: HOSPITAL | Age: 30
End: 2025-05-15
Attending: OBSTETRICS & GYNECOLOGY | Admitting: OBSTETRICS & GYNECOLOGY
Payer: COMMERCIAL

## 2025-05-15 VITALS — WEIGHT: 143.2 LBS | SYSTOLIC BLOOD PRESSURE: 108 MMHG | DIASTOLIC BLOOD PRESSURE: 70 MMHG | BODY MASS INDEX: 23.11 KG/M2

## 2025-05-15 VITALS
TEMPERATURE: 98.2 F | SYSTOLIC BLOOD PRESSURE: 108 MMHG | RESPIRATION RATE: 16 BRPM | DIASTOLIC BLOOD PRESSURE: 66 MMHG | HEART RATE: 88 BPM

## 2025-05-15 DIAGNOSIS — Z34.83 PRENATAL CARE, SUBSEQUENT PREGNANCY, THIRD TRIMESTER: Primary | ICD-10-CM

## 2025-05-15 PROBLEM — O26.853 SPOTTING DURING PREGNANCY IN THIRD TRIMESTER: Status: ACTIVE | Noted: 2025-05-15

## 2025-05-15 LAB
SL AMB  POCT GLUCOSE, UA: NEGATIVE
SL AMB POCT URINE PROTEIN: NEGATIVE

## 2025-05-15 PROCEDURE — 81002 URINALYSIS NONAUTO W/O SCOPE: CPT | Performed by: OBSTETRICS & GYNECOLOGY

## 2025-05-15 PROCEDURE — NC001 PR NO CHARGE: Performed by: OBSTETRICS & GYNECOLOGY

## 2025-05-15 PROCEDURE — PNV: Performed by: OBSTETRICS & GYNECOLOGY

## 2025-05-15 PROCEDURE — 59025 FETAL NON-STRESS TEST: CPT | Performed by: OBSTETRICS & GYNECOLOGY

## 2025-05-15 PROCEDURE — 99212 OFFICE O/P EST SF 10 MIN: CPT

## 2025-05-15 NOTE — PROCEDURES
Ana Luisa Penny, loren  at 30w4d with an LAINA of 2025, by Ultrasound, was seen at [facility] for the following procedure(s): $Procedure Type: NST]    Nonstress Test  Reason for NST: Other (Comment)  Variability: Moderate  Decelerations: None  Accelerations: Yes  Baseline: 140 BPM  Uterine Irritability: No  Contractions: Not present                   Interpretation  Nonstress Test Interpretation: Reactive  Overall Impression: Reassuring

## 2025-05-15 NOTE — PROGRESS NOTES
PN visit    30w4d  Yellow folder and consent signed previously  Rhogam UTD  36wk growth scan scheduled 6/26/25  NSTs also scheduled   Breastpump ordered at next visit.  Will return birth plan next visit after discussing with rochelle.   Tdap possible next visit. Information given today    Denies loss of fluid, contractions or bleeding  +Fetal movement

## 2025-05-15 NOTE — TELEPHONE ENCOUNTER
"FOLLOW UP:   Epic Secure Chat sent to: Dr Rodriguez  [employer] Secure Chat sent to Cheyenne GLOVER RN     REASON FOR CONVERSATION: Vaginal Bleeding - Pregnant    SYMPTOMS: bright red bleeding size of pea     OTHER: Pt calling in saying she's 30w4d , pt saying that she noticed vaginal bleeding the size of a pea, bright red bleeding that started an hour ago. Pt saying when wiping she didn't notice the bleeding. Pt has \"eitan guerrero\" that she has been experiencing \"everyday that is unchanged\". Pt reporting +FM. Pt denies recent sexual intercourse.    Pt is advised to put on a fresh pad to monitor the bleeding, and is notified when to call back, pt verbalized understanding.     DISPOSITION: Go to LD Now (Or to Office With PCP Approval)      Reason for Disposition   Pregnant 24 to 36 weeks () and pinkish or brownish mucous discharge    Answer Assessment - Initial Assessment Questions  1. ONSET: \"When did this bleeding start?\"         An hour ago.   2. BLEEDING SEVERITY: \"Describe the bleeding that you are having.\" \"How much bleeding is there?\"       pt saying that she noticed vaginal bleeding the size of a pea, bright red bleeding that started an hour ago.   3. ABDOMEN PAIN: \"Do you have any pain?\" \"How bad is the pain?\"  (e.g., Scale 0-10; none, mild, moderate, or severe)  Pt reporting eitan guerrero   4. PREGNANCY: \"Do you know how many weeks or months pregnant you are?\"       30w4d   5. LAINA: \"What date are you expecting to deliver?\"      25   6. FETAL MOVEMENT: \"Has the baby's movement decreased or changed significantly from normal?\"      +FM   7. HIGH-RISK PREGNANCY:  \"Have been told by your doctor that you have a high-risk condition that can cause bleeding?\"  (e.g., placenta previa, vasa previa)       Pt denies   8. ULTRASOUND: \"Have you had an ultrasound during this pregnancy?\"  Note: To confirm intrauterine pregnancy, placenta location.      Yes   9. HEMODYNAMIC STATUS: \"Are you weak or feeling lightheaded?\" " "If Yes, ask: \"Can you stand and walk normally?\"       Pt denies feeling weak or lightheaded   10. OTHER SYMPTOMS: \"What other symptoms are you having with the bleeding?\" (e.g., leaking fluid from vagina, contractions)        Pt reporting eitan guerrero contractions, pt denies leakage of fluid    Protocols used: Pregnancy - Vaginal Bleeding Greater Than 20 Weeks EGA-Adult-[place]    "

## 2025-05-15 NOTE — PROGRESS NOTES
Pt reports +FM, denies LOF of VB.  Does get dialy BH ctx usually at night and early morning.     S/p RhoGAM.  Info on TDaP given.      Working with a .      2VC.  Has f/u growth @ 36wks and start NSTs weekly also.     PTL, FLORA, wt gain reviewed.  Advised for TEDs in the next few weeks.

## 2025-05-15 NOTE — PROGRESS NOTES
Progress Note - OB/GYN   Name: Ana Luisa Penny 30 y.o. female I MRN: [id number]  Unit/Bed#: LD TRIAGE 2- I Date of Admission: 5/15/2025   Date of Service: 5/15/2025 I Hospital Day: 0     Assessment & Plan  Spotting during pregnancy in third trimester  - Reassuring exam, no signs of vaginal bleeding  - NST reactive   Discharge instructions given to patient and labor precautions reviewed    Triage Note - OB  Ana Luisa Penny 30 y.o. female MRN: [id number]  Unit/Bed#: LD TRIAGE 2- Encounter: [id number]    Chief Complaint: Spotting   LAINA: Estimated Date of Delivery: 25    HPI: 30 y.o. female  at 30w4d presents with c/o spot of blood on underwear today, no blood noted in toilet or on pad, denies any pain/contractions, vaginal discharge  +FM.  Pt states she had a routine PNV this morning.      Vitals: Blood pressure 108/66, pulse 88, last menstrual period 10/08/2024, not currently breastfeeding.,There is no height or weight on file to calculate BMI.    Physical Exam  GEN: well developed and well nourished, alert, oriented times 3, and appears comfortable  Heart: Regular rate and rhythm   Lungs: normal symmetric air entry  Abd: non tender and gravid  Speculum exam - no visible blood, normal leukorrhea, no redness , cervix visually closed    FHR: 140, Cat 1  [place]: quiet    Labs:   Routine Prenatal on 05/15/2025   Component Date Value    POCT URINE PROTEIN 05/15/2025 Negative     GLUCOSE, UA 05/15/2025 Negative        Lab, Imaging and other studies: I have personally reviewed pertinent reports.

## 2025-05-15 NOTE — ASSESSMENT & PLAN NOTE
- Reassuring exam, no signs of vaginal bleeding  - NST reactive   Discharge instructions given to patient and labor precautions reviewed

## 2025-05-16 ENCOUNTER — TELEPHONE (OUTPATIENT)
Age: 30
End: 2025-05-16

## 2025-05-16 NOTE — TELEPHONE ENCOUNTER
Patient called to schedule the rest of her appts up to 40 weeks. Eladio needs a 37wk as there was nothing for the location I could put her in besides 7/7 sand eladio has her 38 week on 7/10. Patient also needs her 39 wk appt as her 38wk was for Thursday and only found 7/14 and 7/21 for appts. Patient can be reached at [phone]  Thank You

## 2025-05-29 ENCOUNTER — ROUTINE PRENATAL (OUTPATIENT)
Age: 30
End: 2025-05-29

## 2025-05-29 VITALS — BODY MASS INDEX: 23.5 KG/M2 | WEIGHT: 145.6 LBS | SYSTOLIC BLOOD PRESSURE: 112 MMHG | DIASTOLIC BLOOD PRESSURE: 62 MMHG

## 2025-05-29 DIAGNOSIS — Z34.83 PRENATAL CARE, SUBSEQUENT PREGNANCY, THIRD TRIMESTER: Primary | ICD-10-CM

## 2025-05-29 DIAGNOSIS — O26.853 SPOTTING DURING PREGNANCY IN THIRD TRIMESTER: ICD-10-CM

## 2025-05-29 DIAGNOSIS — Z23 NEED FOR DIPHTHERIA-TETANUS-PERTUSSIS (TDAP) VACCINE: ICD-10-CM

## 2025-05-29 LAB
SL AMB  POCT GLUCOSE, UA: NORMAL
SL AMB POCT URINE PROTEIN: NORMAL

## 2025-05-29 NOTE — PROGRESS NOTES
32w4d  Working with a    Birth Plan returned    Tdap given today  Up to date Rhogam  36 wk Growth scan scheduled 6/26  NST's weekly at 36 wks (scheduled)    Denies Leaking of Fluid, vaginal bleeding, contractions  (NST after last visit with us due to small amount of vaginal bleeding- reports nothing since)  +Fetal Movement

## 2025-05-30 LAB
DME PARACHUTE DELIVERY DATE ACTUAL: NORMAL
DME PARACHUTE DELIVERY DATE REQUESTED: NORMAL
DME PARACHUTE ITEM DESCRIPTION: NORMAL
DME PARACHUTE ORDER STATUS: NORMAL
DME PARACHUTE SUPPLIER NAME: NORMAL
DME PARACHUTE SUPPLIER PHONE: NORMAL

## 2025-06-02 ENCOUNTER — PATIENT MESSAGE (OUTPATIENT)
Age: 30
End: 2025-06-02

## 2025-06-05 ENCOUNTER — APPOINTMENT (OUTPATIENT)
Dept: LAB | Facility: HOSPITAL | Age: 30
End: 2025-06-05

## 2025-06-05 DIAGNOSIS — Z00.8 HEALTH EXAMINATION IN POPULATION SURVEY: ICD-10-CM

## 2025-06-05 LAB
CHOLEST SERPL-MCNC: 211 MG/DL (ref ?–200)
EST. AVERAGE GLUCOSE BLD GHB EST-MCNC: 105 MG/DL
HBA1C MFR BLD: 5.3 %
HDLC SERPL-MCNC: 68 MG/DL
LDLC SERPL CALC-MCNC: 95 MG/DL (ref 0–100)
NONHDLC SERPL-MCNC: 143 MG/DL
TRIGL SERPL-MCNC: 240 MG/DL (ref ?–150)

## 2025-06-05 PROCEDURE — 36415 COLL VENOUS BLD VENIPUNCTURE: CPT

## 2025-06-05 PROCEDURE — 83036 HEMOGLOBIN GLYCOSYLATED A1C: CPT

## 2025-06-05 PROCEDURE — 80061 LIPID PANEL: CPT

## 2025-06-11 ENCOUNTER — HOSPITAL ENCOUNTER (OUTPATIENT)
Facility: HOSPITAL | Age: 30
Discharge: HOME | End: 2025-06-11
Attending: STUDENT IN AN ORGANIZED HEALTH CARE EDUCATION/TRAINING PROGRAM | Admitting: STUDENT IN AN ORGANIZED HEALTH CARE EDUCATION/TRAINING PROGRAM
Payer: COMMERCIAL

## 2025-06-11 ENCOUNTER — NURSE TRIAGE (OUTPATIENT)
Age: 30
End: 2025-06-11

## 2025-06-11 VITALS
RESPIRATION RATE: 18 BRPM | TEMPERATURE: 98.1 F | HEART RATE: 74 BPM | DIASTOLIC BLOOD PRESSURE: 71 MMHG | SYSTOLIC BLOOD PRESSURE: 111 MMHG

## 2025-06-11 PROCEDURE — 99212 OFFICE O/P EST SF 10 MIN: CPT

## 2025-06-11 NOTE — TELEPHONE ENCOUNTER
"REASON FOR CONVERSATION: Fall    SYMPTOMS: 34w3d,  Kenneth got caught on step, tripped and fell on to landing on left side.  +FM.  Denies vaginal bleeding, leaking of fluid or abdominal pain.  Has scrape to knee.  Doesn't believe she hit abdomen.      OTHER HEALTH INFORMATION: None    PROTOCOL DISPOSITION: Go to LD Now    CARE ADVICE PROVIDED: Please go to L&D NOW.  ESC to on call provider and L&D Charge RN.     PRACTICE FOLLOW-UP: None          Reason for Disposition   Pregnant 20 or more weeks and fall to ground or floor (e.g., walking and tripped)    Answer Assessment - Initial Assessment Questions  1. MECHANISM: \"How did the fall happen?\"      Kenneth got caught on step and tripped, falling on left side on landing  2. DOMESTIC VIOLENCE SCREENING: \"Did you fall because someone pushed you or tried to hurt you?\"      denies  3. ONSET: \"When did the fall happen?\" (e.g., minutes, hours, or days ago)      15 mins ago   4. LOCATION: \"What part of the body hit the ground?\" (e.g., back, buttocks, head, hips, knees, hands, head, stomach)        Left side.  5. INJURY: \"Did you get hurt when you fell? Are there any obvious injuries?\" If Yes, ask: \"What does the injury look like?\"      Did hit knee but ok.   6. PAIN: \"Is there any pain?\" If Yes, ask: \"How bad is the pain?\" (e.g., Scale 1-10; or mild,       1/10  7. SIZE: For cuts, bruises, or swelling, ask: \"How large is it?\" (e.g., inches or centimeters)      Scrape on knee  8. LAINA: \"What date are you expecting to deliver?\"      25  9. FETAL MOVEMENT: \"Has the baby's movement decreased or changed significantly from       +FM  10. TETANUS: For any breaks in the skin, ask: \"When was the last tetanus booster?\"        Thinks so  11. OTHER SYMPTOMS: \"Do you have any other symptoms?\" (e.g., abdomen pain, contractions, leaking of fluid from vagina or concerned water broke, vaginal bleeding)        denies    Protocols used: Pregnancy - Fall-Adult-[place]    "

## 2025-06-12 ENCOUNTER — TELEPHONE (OUTPATIENT)
Dept: OBGYN CLINIC | Facility: CLINIC | Age: 30
End: 2025-06-12

## 2025-06-12 NOTE — TELEPHONE ENCOUNTER
Spoke w/pt to f/u after her fall. States she was seen at [facility] triage for NST, which was reassuring.   States she is feeling well, slightly sore, reviewed that she can Tylenol as needed for any discomfort, verbalized understanding

## 2025-06-16 ENCOUNTER — ROUTINE PRENATAL (OUTPATIENT)
Age: 30
End: 2025-06-16

## 2025-06-16 VITALS — BODY MASS INDEX: 24.02 KG/M2 | WEIGHT: 148.8 LBS | SYSTOLIC BLOOD PRESSURE: 106 MMHG | DIASTOLIC BLOOD PRESSURE: 62 MMHG

## 2025-06-16 DIAGNOSIS — Z34.83 PRENATAL CARE, SUBSEQUENT PREGNANCY, THIRD TRIMESTER: Primary | ICD-10-CM

## 2025-06-16 PROCEDURE — PNV: Performed by: OBSTETRICS & GYNECOLOGY

## 2025-06-16 NOTE — PROGRESS NOTES
35w1d  Working with a    Birth Plan returned    Received Tdap vaccine  Up to date Rhogam  36 wk Growth scan scheduled 6/26  NST's weekly at 36 wks (scheduled)    Denies Leaking of Fluid, vaginal bleeding, contractions  [place] Hernadez lasting 45 min - come and go throughout the night  +Fetal Movement

## 2025-06-26 ENCOUNTER — ROUTINE PRENATAL (OUTPATIENT)
Age: 30
End: 2025-06-26

## 2025-06-26 ENCOUNTER — ANCILLARY PROCEDURE (OUTPATIENT)
Age: 30
End: 2025-06-26
Attending: OBSTETRICS & GYNECOLOGY
Payer: COMMERCIAL

## 2025-06-26 ENCOUNTER — ULTRASOUND (OUTPATIENT)
Age: 30
End: 2025-06-26
Attending: OBSTETRICS & GYNECOLOGY
Payer: COMMERCIAL

## 2025-06-26 VITALS
HEIGHT: 63 IN | WEIGHT: 150.6 LBS | SYSTOLIC BLOOD PRESSURE: 116 MMHG | DIASTOLIC BLOOD PRESSURE: 62 MMHG | HEART RATE: 77 BPM | BODY MASS INDEX: 26.68 KG/M2

## 2025-06-26 VITALS — WEIGHT: 151 LBS | DIASTOLIC BLOOD PRESSURE: 68 MMHG | BODY MASS INDEX: 26.75 KG/M2 | SYSTOLIC BLOOD PRESSURE: 104 MMHG

## 2025-06-26 DIAGNOSIS — Z36.85 ANTENATAL SCREENING FOR STREPTOCOCCUS B: ICD-10-CM

## 2025-06-26 DIAGNOSIS — Z3A.36 36 WEEKS GESTATION OF PREGNANCY: ICD-10-CM

## 2025-06-26 DIAGNOSIS — Z34.83 PRENATAL CARE, SUBSEQUENT PREGNANCY IN THIRD TRIMESTER: Primary | ICD-10-CM

## 2025-06-26 DIAGNOSIS — Z3A.36 36 WEEKS GESTATION OF PREGNANCY: Primary | ICD-10-CM

## 2025-06-26 DIAGNOSIS — O09.899 SINGLE UMBILICAL ARTERY AFFECTING MANAGEMENT OF MOTHER, ANTEPARTUM, SINGLE GESTATION: ICD-10-CM

## 2025-06-26 PROCEDURE — 76816 OB US FOLLOW-UP PER FETUS: CPT | Performed by: OBSTETRICS & GYNECOLOGY

## 2025-06-26 PROCEDURE — NC001 PR NO CHARGE: Performed by: OBSTETRICS & GYNECOLOGY

## 2025-06-26 PROCEDURE — 99213 OFFICE O/P EST LOW 20 MIN: CPT | Performed by: OBSTETRICS & GYNECOLOGY

## 2025-06-26 PROCEDURE — PNV: Performed by: OBSTETRICS & GYNECOLOGY

## 2025-06-26 PROCEDURE — 87150 DNA/RNA AMPLIFIED PROBE: CPT | Performed by: OBSTETRICS & GYNECOLOGY

## 2025-06-26 NOTE — PROGRESS NOTES
A fetal ultrasound and NST were  completed. See Ob procedures in Epic for an interpretation and recommendations for the ultrasound component.     Summary: Normal fetal growth with estimated fetal weight at 6 pounds 15 ounces corresponding to 6 (1st percentile.    Nonstress test at 36-4/7 weeks for evaluation of fetal growth and development.    Indication: Single umbilical artery    Interpretation reactive.    I reviewed the results of this ultrasound and nonstress test with Ms. Penny and answered her questions.    Plan:    I recommended that she presents to the hospital if any new symptoms develop.    2.  Once a week nonstress test and SHAUNNA.    I completed this visit in 20 minutes, 5 in previsit and in face-to-face counseling of 5 in postvisit and coordination of plan of care.  Thank you for referring Ms. Penny to our care maternal-fetal medicine. Do not hesitate to contact us in [facility] if you have questions.    Tai Moa MD, MSCE  Maternal Fetal Medicine

## 2025-06-26 NOTE — PROGRESS NOTES
36w5d    Working with a    Birth Plan returned  GBS today  Up to date Rhogam and Tdap  36 wk Growth scan today  NST's weekly at 36 wks (scheduled)    Denies Leaking of Fluid, vaginal bleeding, contractions  Jeremiah Hernadez but not regular   +Fetal Movement

## 2025-06-26 NOTE — LETTER
NST sleeve cover sheet    Patient name: Ana Luisa Penny  : 1995  MRN: [id number]    LAINA: Estimated Date of Delivery: 25    Obstetrician: CRISTI Aquino    Reason(s) for testing: SUA    Testing frequency:    ___  2x/wk  _x_  1x/wk  ___  Dopplers  ___  BPP?      Last growth scan: __________, _________, _________    Baby:      Male   /   Female   /   [place]             Baby Name: ___________________            IOL or  C/S: _____________________

## 2025-06-26 NOTE — LETTER
June 26, 2025     Greta Haines MD  [address] [facility] [address]    Patient: Ana Luisa Penny   YOB: 1995   Date of Visit: 6/26/2025       Dear Dr. Greta Haines MD:    Thank you for referring Ana Luisa Penny to me for evaluation. Below are my notes for this consultation.    If you have questions, please do not hesitate to call me. I look forward to following your patient along with you.         Sincerely,        Tai Mao MD        CC: No Recipients    Tai Mao MD  6/26/2025 10:33 AM  Sign when Signing Visit  A fetal ultrasound and NST were  completed. See Ob procedures in Epic for an interpretation and recommendations for the ultrasound component.     Summary: Normal fetal growth with estimated fetal weight at 6 pounds 15 ounces corresponding to 6 (1st percentile.    Nonstress test at 36-4/7 weeks for evaluation of fetal growth and development.    Indication: Single umbilical artery    Interpretation reactive.    I reviewed the results of this ultrasound and nonstress test with Ms. Penny and answered her questions.    Plan:    I recommended that she presents to the hospital if any new symptoms develop.    2.  Once a week nonstress test and SHAUNNA.    I completed this visit in 20 minutes, 5 in previsit and in face-to-face counseling of 5 in postvisit and coordination of plan of care.  Thank you for referring Ms. Penny to our care maternal-fetal medicine. Do not hesitate to contact us in [facility] if you have questions.    Tai Mao MD, MSCE  Maternal Fetal Medicine

## 2025-06-26 NOTE — PROGRESS NOTES
Repeat Non-Stress Testing:    Patient verbalizes +FM. Pt denies ALL:               Leaking of fluid   Contractions   Vaginal bleeding   Decreased fetal movement    Patient is performing daily kick counts. Patient has no questions or concerns.   NST strip reviewed by Dr. Mao in-person.

## 2025-06-27 ENCOUNTER — TELEPHONE (OUTPATIENT)
Dept: PERINATAL CARE | Facility: OTHER | Age: 30
End: 2025-06-27

## 2025-06-27 NOTE — TELEPHONE ENCOUNTER
Left voicemail informing patient we needed to reschedule her 7/1 nst/shiva appointment. Informed patient we had an opening for 7/2 in [place] at 11am. We did go ahead and schedule the appointment to place a hold on the spot. Requested she give our office a call back at [phone] with any questions or if she would need to reschedule.

## 2025-06-30 LAB — GP B STREP DNA SPEC QL NAA+PROBE: NEGATIVE

## 2025-07-02 ENCOUNTER — ULTRASOUND (OUTPATIENT)
Age: 30
End: 2025-07-02
Payer: COMMERCIAL

## 2025-07-02 ENCOUNTER — ANCILLARY PROCEDURE (OUTPATIENT)
Age: 30
End: 2025-07-02
Attending: OBSTETRICS & GYNECOLOGY
Payer: COMMERCIAL

## 2025-07-02 VITALS
SYSTOLIC BLOOD PRESSURE: 112 MMHG | HEIGHT: 63 IN | BODY MASS INDEX: 26.9 KG/M2 | DIASTOLIC BLOOD PRESSURE: 68 MMHG | WEIGHT: 151.8 LBS | HEART RATE: 85 BPM

## 2025-07-02 DIAGNOSIS — O09.899 SINGLE UMBILICAL ARTERY AFFECTING MANAGEMENT OF MOTHER, ANTEPARTUM, SINGLE GESTATION: ICD-10-CM

## 2025-07-02 DIAGNOSIS — Z3A.37 37 WEEKS GESTATION OF PREGNANCY: Primary | ICD-10-CM

## 2025-07-02 DIAGNOSIS — Z33.1 NORMAL PREGNANCY, INCIDENTAL: ICD-10-CM

## 2025-07-02 PROCEDURE — 59025 FETAL NON-STRESS TEST: CPT | Performed by: OBSTETRICS & GYNECOLOGY

## 2025-07-02 PROCEDURE — 76815 OB US LIMITED FETUS(S): CPT | Performed by: OBSTETRICS & GYNECOLOGY

## 2025-07-02 NOTE — PROGRESS NOTES
Nonstress test at 37 weeks and 3 days.    Indication: Single umbilical artery.    Interpretation: Reactive.    Plan: Once a week NST once a week SHAUNNA Daily fetal movement counts.    To present to the hospital and new symptoms develop.    Tai Mao MD, MSCE    Maternal-fetal medicine

## 2025-07-08 ENCOUNTER — ULTRASOUND (OUTPATIENT)
Age: 30
End: 2025-07-08
Attending: OBSTETRICS & GYNECOLOGY
Payer: COMMERCIAL

## 2025-07-08 ENCOUNTER — ANCILLARY PROCEDURE (OUTPATIENT)
Age: 30
End: 2025-07-08
Attending: OBSTETRICS & GYNECOLOGY
Payer: COMMERCIAL

## 2025-07-08 VITALS
HEIGHT: 63 IN | HEART RATE: 73 BPM | WEIGHT: 152 LBS | SYSTOLIC BLOOD PRESSURE: 110 MMHG | BODY MASS INDEX: 26.93 KG/M2 | DIASTOLIC BLOOD PRESSURE: 72 MMHG

## 2025-07-08 DIAGNOSIS — Z3A.38 38 WEEKS GESTATION OF PREGNANCY: Primary | ICD-10-CM

## 2025-07-08 DIAGNOSIS — Z3A.38 38 WEEKS GESTATION OF PREGNANCY: ICD-10-CM

## 2025-07-08 DIAGNOSIS — O09.899 SINGLE UMBILICAL ARTERY AFFECTING MANAGEMENT OF MOTHER, ANTEPARTUM, SINGLE GESTATION: ICD-10-CM

## 2025-07-08 PROCEDURE — 76815 OB US LIMITED FETUS(S): CPT | Performed by: OBSTETRICS & GYNECOLOGY

## 2025-07-08 PROCEDURE — 59025 FETAL NON-STRESS TEST: CPT | Performed by: OBSTETRICS & GYNECOLOGY

## 2025-07-08 NOTE — PROGRESS NOTES
A fetal ultrasound  and NST were completed. See imaging in Epic for an interpretation and recommendations. Do not hesitate to contact us in [facility] if you have questions.    Tai Mao MD, MSCE  Maternal Fetal Medicine

## 2025-07-10 ENCOUNTER — ROUTINE PRENATAL (OUTPATIENT)
Age: 30
End: 2025-07-10

## 2025-07-10 VITALS — SYSTOLIC BLOOD PRESSURE: 100 MMHG | DIASTOLIC BLOOD PRESSURE: 70 MMHG | WEIGHT: 154.6 LBS | BODY MASS INDEX: 27.39 KG/M2

## 2025-07-10 DIAGNOSIS — Z34.83 PRENATAL CARE, SUBSEQUENT PREGNANCY, THIRD TRIMESTER: Primary | ICD-10-CM

## 2025-07-10 PROCEDURE — PNV: Performed by: OBSTETRICS & GYNECOLOGY

## 2025-07-10 NOTE — PROGRESS NOTES
PN visit    38w4d  Getting NSTs weekly  GBS negative  Cervical check today    Denies loss of fluid, contractions or bleeding  + eitan guerrero  +Fetal movement

## 2025-07-13 NOTE — PROGRESS NOTES
Routine PN visit.   Discussed end of pregnancy planning/options for IOL.  Would just need morning for pitocin.    Baby is active.   GBS negative.   She will send a [employer] red truck driving schoolhart message with her decision for IOL.

## 2025-07-16 ENCOUNTER — ANESTHESIA EVENT (INPATIENT)
Dept: ANESTHESIOLOGY | Facility: HOSPITAL | Age: 30
End: 2025-07-16
Payer: COMMERCIAL

## 2025-07-16 ENCOUNTER — HOSPITAL ENCOUNTER (INPATIENT)
Facility: HOSPITAL | Age: 30
LOS: 2 days | Discharge: HOME | End: 2025-07-18
Attending: OBSTETRICS & GYNECOLOGY | Admitting: OBSTETRICS & GYNECOLOGY
Payer: COMMERCIAL

## 2025-07-16 ENCOUNTER — ANESTHESIA (INPATIENT)
Dept: ANESTHESIOLOGY | Facility: HOSPITAL | Age: 30
End: 2025-07-16
Payer: COMMERCIAL

## 2025-07-16 ENCOUNTER — HOSPITAL ENCOUNTER (OUTPATIENT)
Dept: LABOR AND DELIVERY | Facility: HOSPITAL | Age: 30
Discharge: HOME | End: 2025-07-16
Payer: COMMERCIAL

## 2025-07-16 PROBLEM — Z34.90 ENCOUNTER FOR ELECTIVE INDUCTION OF LABOR: Status: ACTIVE | Noted: 2025-07-16

## 2025-07-16 LAB
ABO GROUP BLD: NORMAL
BASE EXCESS BLDCOA CALC-SCNC: -5.3 MMOL/L (ref 3–11)
BASE EXCESS BLDCOV CALC-SCNC: -5.1 MMOL/L (ref 1–9)
BLD GP AB SCN SERPL QL: NEGATIVE
ERYTHROCYTE [DISTWIDTH] IN BLOOD BY AUTOMATED COUNT: 13.6 % (ref 11.6–15.1)
HCO3 BLDCOA-SCNC: 23.3 MMOL/L (ref 17.3–27.3)
HCO3 BLDCOV-SCNC: 20.5 MMOL/L (ref 12.2–28.6)
HCT VFR BLD AUTO: 33.3 % (ref 34.8–46.1)
HGB BLD-MCNC: 10.8 G/DL (ref 11.5–15.4)
HOLD SPECIMEN: NORMAL
MCH RBC QN AUTO: 25.7 PG (ref 26.8–34.3)
MCHC RBC AUTO-ENTMCNC: 32.4 G/DL (ref 31.4–37.4)
MCV RBC AUTO: 79 FL (ref 82–98)
O2 CT VFR BLDCOA CALC: 16.9 ML/DL
OXYHGB MFR BLDCOA: 71 %
OXYHGB MFR BLDCOV: 43.5 %
PCO2 BLDCOA: 57 MM[HG] (ref 30–60)
PCO2 BLDCOV: 39.9 MM HG (ref 27–43)
PH BLDCOA: 7.23 [PH] (ref 7.23–7.43)
PH BLDCOV: 7.33 [PH] (ref 7.19–7.49)
PLATELET # BLD AUTO: 168 THOUSANDS/UL (ref 149–390)
PMV BLD AUTO: 11 FL (ref 8.9–12.7)
PO2 BLDCOA: 35 MM HG (ref 5–25)
PO2 BLDCOV: 19.1 MM HG (ref 15–45)
RBC # BLD AUTO: 4.2 MILLION/UL (ref 3.81–5.12)
RH BLD: NEGATIVE
SAO2 % BLDCOV: 9.8 ML/DL
SPECIMEN EXPIRATION DATE: NORMAL
TREPONEMA PALLIDUM IGG+IGM AB [PRESENCE] IN SERUM OR PLASMA BY IMMUNOASSAY: NORMAL
WBC # BLD AUTO: 6.81 THOUSAND/UL (ref 4.31–10.16)

## 2025-07-16 PROCEDURE — 86850 RBC ANTIBODY SCREEN: CPT | Performed by: OBSTETRICS & GYNECOLOGY

## 2025-07-16 PROCEDURE — 82805 BLOOD GASES W/O2 SATURATION: CPT | Performed by: OBSTETRICS & GYNECOLOGY

## 2025-07-16 PROCEDURE — 85027 COMPLETE CBC AUTOMATED: CPT | Performed by: OBSTETRICS & GYNECOLOGY

## 2025-07-16 PROCEDURE — 86900 BLOOD TYPING SEROLOGIC ABO: CPT | Performed by: OBSTETRICS & GYNECOLOGY

## 2025-07-16 PROCEDURE — 4A1HXCZ MONITORING OF PRODUCTS OF CONCEPTION, CARDIAC RATE, EXTERNAL APPROACH: ICD-10-PCS | Performed by: OBSTETRICS & GYNECOLOGY

## 2025-07-16 PROCEDURE — 59400 OBSTETRICAL CARE: CPT | Performed by: OBSTETRICS & GYNECOLOGY

## 2025-07-16 PROCEDURE — 86901 BLOOD TYPING SEROLOGIC RH(D): CPT | Performed by: OBSTETRICS & GYNECOLOGY

## 2025-07-16 PROCEDURE — NC001 PR NO CHARGE: Performed by: OBSTETRICS & GYNECOLOGY

## 2025-07-16 PROCEDURE — 0HQ9XZZ REPAIR PERINEUM SKIN, EXTERNAL APPROACH: ICD-10-PCS | Performed by: OBSTETRICS & GYNECOLOGY

## 2025-07-16 PROCEDURE — 86780 TREPONEMA PALLIDUM: CPT | Performed by: OBSTETRICS & GYNECOLOGY

## 2025-07-16 PROCEDURE — 3E033VJ INTRODUCTION OF OTHER HORMONE INTO PERIPHERAL VEIN, PERCUTANEOUS APPROACH: ICD-10-PCS | Performed by: OBSTETRICS & GYNECOLOGY

## 2025-07-16 RX ORDER — SODIUM CHLORIDE, SODIUM LACTATE, POTASSIUM CHLORIDE, CALCIUM CHLORIDE 600; 310; 30; 20 MG/100ML; MG/100ML; MG/100ML; MG/100ML
125 INJECTION, SOLUTION INTRAVENOUS CONTINUOUS
Status: DISCONTINUED | OUTPATIENT
Start: 2025-07-16 | End: 2025-07-17

## 2025-07-16 RX ORDER — BUPIVACAINE HYDROCHLORIDE 2.5 MG/ML
INJECTION, SOLUTION EPIDURAL; INFILTRATION; INTRACAUDAL; PERINEURAL AS NEEDED
Status: DISCONTINUED | OUTPATIENT
Start: 2025-07-16 | End: 2025-07-17 | Stop reason: HOSPADM

## 2025-07-16 RX ORDER — ROPIVACAINE HYDROCHLORIDE 2 MG/ML
INJECTION, SOLUTION EPIDURAL; INFILTRATION; PERINEURAL CONTINUOUS PRN
Status: DISCONTINUED | OUTPATIENT
Start: 2025-07-16 | End: 2025-07-17 | Stop reason: HOSPADM

## 2025-07-16 RX ORDER — ONDANSETRON 2 MG/ML
4 INJECTION INTRAMUSCULAR; INTRAVENOUS EVERY 6 HOURS PRN
Status: DISCONTINUED | OUTPATIENT
Start: 2025-07-16 | End: 2025-07-17

## 2025-07-16 RX ORDER — BUPIVACAINE HYDROCHLORIDE 2.5 MG/ML
30 INJECTION, SOLUTION EPIDURAL; INFILTRATION; INTRACAUDAL; PERINEURAL ONCE AS NEEDED
Status: DISCONTINUED | OUTPATIENT
Start: 2025-07-16 | End: 2025-07-17

## 2025-07-16 RX ORDER — LIDOCAINE HYDROCHLORIDE 10 MG/ML
INJECTION, SOLUTION EPIDURAL; INFILTRATION; INTRACAUDAL; PERINEURAL AS NEEDED
Status: DISCONTINUED | OUTPATIENT
Start: 2025-07-16 | End: 2025-07-17 | Stop reason: HOSPADM

## 2025-07-16 RX ORDER — OXYTOCIN/0.9 % SODIUM CHLORIDE 30/500 ML
1-30 PLASTIC BAG, INJECTION (ML) INTRAVENOUS
Status: DISCONTINUED | OUTPATIENT
Start: 2025-07-16 | End: 2025-07-17

## 2025-07-16 RX ORDER — LIDOCAINE HYDROCHLORIDE AND EPINEPHRINE 15; 5 MG/ML; UG/ML
INJECTION, SOLUTION EPIDURAL AS NEEDED
Status: DISCONTINUED | OUTPATIENT
Start: 2025-07-16 | End: 2025-07-17 | Stop reason: HOSPADM

## 2025-07-16 RX ADMIN — Medication 2 MILLI-UNITS/MIN: at 09:40

## 2025-07-16 RX ADMIN — BUPIVACAINE HYDROCHLORIDE 5 ML: 2.5 INJECTION, SOLUTION EPIDURAL; INFILTRATION; INTRACAUDAL; PERINEURAL at 17:22

## 2025-07-16 RX ADMIN — ROPIVACAINE HYDROCHLORIDE: 2 INJECTION, SOLUTION EPIDURAL; INFILTRATION at 17:34

## 2025-07-16 RX ADMIN — SODIUM CHLORIDE, SODIUM LACTATE, POTASSIUM CHLORIDE, AND CALCIUM CHLORIDE 125 ML/HR: .6; .31; .03; .02 INJECTION, SOLUTION INTRAVENOUS at 17:00

## 2025-07-16 RX ADMIN — BUPIVACAINE HYDROCHLORIDE 2 ML: 2.5 INJECTION, SOLUTION EPIDURAL; INFILTRATION; INTRACAUDAL; PERINEURAL at 17:31

## 2025-07-16 RX ADMIN — LIDOCAINE HYDROCHLORIDE 3 ML: 10 INJECTION, SOLUTION EPIDURAL; INFILTRATION; INTRACAUDAL at 17:19

## 2025-07-16 RX ADMIN — BUPIVACAINE HYDROCHLORIDE 5 ML: 2.5 INJECTION, SOLUTION EPIDURAL; INFILTRATION; INTRACAUDAL; PERINEURAL at 18:35

## 2025-07-16 RX ADMIN — SODIUM CHLORIDE, SODIUM LACTATE, POTASSIUM CHLORIDE, AND CALCIUM CHLORIDE 125 ML/HR: .6; .31; .03; .02 INJECTION, SOLUTION INTRAVENOUS at 09:12

## 2025-07-16 RX ADMIN — LIDOCAINE HYDROCHLORIDE,EPINEPHRINE BITARTRATE 3 ML: 15; .005 INJECTION, SOLUTION EPIDURAL; INFILTRATION; INTRACAUDAL; PERINEURAL at 17:24

## 2025-07-16 RX ADMIN — ROPIVACAINE HYDROCHLORIDE 12 ML/HR: 2 INJECTION, SOLUTION EPIDURAL; INFILTRATION at 17:30

## 2025-07-16 NOTE — OB LABOR/OXYTOCIN SAFETY PROGRESS
Oxytocin Safety Progress Check Note - Ana Luisa Penny 30 y.o. female MRN: [id number]    Unit/Bed#: -01 Encounter: [id number]    Dose (mark-units/min) Oxytocin: 6 mark-units/min  Contraction Frequency (minutes): 2  Contraction Intensity: Mild  Uterine Activity Characteristics: Irregular  Cervical Dilation: 4        Cervical Effacement: 50  Fetal Station: -2  Baseline Rate (FHR): 140 bpm     FHR Category: 1               Vital Signs:   Vitals:    07/16/25 1257   BP: 118/76   Pulse: 68   Resp: 16   Temp: 97.6 °F (36.4 °C)   SpO2:        Notes/comments:     Patient feeling contractions more.   Would like to hold off on AROM for now.  Doing well with pain control.  Continue pitocin.        Jossy Johnston MD 7/16/2025 1:46 PM

## 2025-07-16 NOTE — OB LABOR/OXYTOCIN SAFETY PROGRESS
Oxytocin Safety Progress Check Note - Ana Luisa TIMOTHY Robbyalejandroruss 30 y.o. female MRN: [id number]    Unit/Bed#: -01 Encounter: [id number]    Dose (mark-units/min) Oxytocin: 6 mark-units/min  Contraction Frequency (minutes): 2-3     Uterine Activity Characteristics: Occasional                 Baseline Rate (FHR): 145 bpm     FHR Category: 1               Vital Signs:   Vitals:    07/16/25 1100   BP:    Pulse: 83   Resp:    Temp:    SpO2:        Notes/comments:     Patient doing well, feeling contractions more now.  Defer exam at this time.  Pain control upon request.      Jossy Johnston MD 7/16/2025 11:55 AM

## 2025-07-16 NOTE — OB LABOR/OXYTOCIN SAFETY PROGRESS
Oxytocin Safety Progress Check Note - Ana Luisa Penny 30 y.o. female MRN: [id number]    Unit/Bed#: -01 Encounter: [id number]    Dose (mark-units/min) Oxytocin: 6 mark-units/min  Contraction Frequency (minutes): 1-3  Contraction Intensity: Mild/Moderate  Uterine Activity Characteristics: Irregular  Cervical Dilation: 4-5        Cervical Effacement: 70  Fetal Station: -2  Baseline Rate (FHR): 135 bpm     FHR Category: 1               Vital Signs:   Vitals:    07/16/25 1539   BP: 118/71   Pulse: 72   Resp: 16   Temp: 97.7 °F (36.5 °C)   SpO2:        Notes/comments:    Discussed AROM and benefits.   Would like to continue holding off for now.  Libby is on her way.  Continue pitocin.   Patient doing well.     Jossy Johnston MD 7/16/2025 4:20 PM

## 2025-07-16 NOTE — H&P
OB H&P  Ana Luisa Penny  1995  /-01          Assessment/Plan:   at 39 weeks.   Here for IOL for elective   Options explored in the outpatient setting, and she has elected induction of labor.  She reaffirms that decision today.  I discussed with the patient in detail the risks, benefits, and alternatives to induction of labor in this setting.  The risks discussed included, but were not limited to: the risk of uterine rupture, with its attendant risks of hysterectomy and maternal and or fetal death; the risk of fetal hypoxic stress and permanent neurologic injury; as well as the risk of a prolonged induction ending in  section.  Following our discussion of risks, benefits, and alternatives, all of the patient's  questions were answered, and she stated her understanding, and her desire to assume these risks and to proceed with induction of labor.   Admit.  Labs/toco/IVF.  Induction of labor beginning with pitocin      GBS negative      30 y.o. yo  at 39 weeks by us and lmp    Chief complaint:  I'm here for induction    HPI:  Pt presents for elective IOL       Pregnancy Complications:  Problem List[1]      Past Medical History:  Past Medical History[2]    Past Surgical History:  Past Surgical History[3]    Social Hx:  Social History     Socioeconomic History    Marital status: /Civil Union     Spouse name: Not on file    Number of children: Not on file    Years of education: Not on file    Highest education level: Not on file   Occupational History    Not on file   Tobacco Use    Smoking status: Never     Passive exposure: Never    Smokeless tobacco: Never   Vaping Use    Vaping status: Never Used   Substance and Sexual Activity    Alcohol use: Not Currently     Alcohol/week: 3.0 standard drinks of alcohol     Types: 3 Glasses of wine per week     Comment: socially    Drug use: Never    Sexual activity: Yes     Partners: Male     Birth control/protection: None   Other  Topics Concern    Not on file   Social History Narrative    Not on file     Social Drivers of Health     Financial Resource Strain: Not on file   Food Insecurity: Patient Unable To Answer (7/16/2025)    Nursing - Inadequate Food Risk Classification     Worried About Running Out of Food in the Last Year: Never true     Ran Out of Food in the Last Year: Never true     Ran Out of Food in the Last Year: Patient unable to answer   Transportation Needs: Patient Unable To Answer (7/16/2025)    Nursing - Transportation Risk Classification     Lack of Transportation: Not on file     Lack of Transportation: Patient unable to answer   Physical Activity: Not on file   Stress: Not on file   Social Connections: Not on file   Intimate Partner Violence: Patient Unable To Answer (7/16/2025)    Nursing IPS     Feels Physically and Emotionally Safe: Not on file     Physically Hurt by Someone: Not on file     Humiliated or Emotionally Abused by Someone: Not on file     Physically Hurt by Someone: Patient unable to answer     Hurt or Threatened by Someone: Patient unable to answer   Housing Stability: Patient Unable To Answer (7/16/2025)    Nursing: Inadequate Housing Risk Classification     Has Housing: Not on file     Worried About Losing Housing: Not on file     Unable to Get Utilities: Not on file     Unable to Pay for Housing in the Last Year: Patient unable to answer     Has Housing: Patient unable to answer       Meds:  Medications Ordered Prior to Encounter[4]    Allergies:  Allergies[5]        RoS/    Constitutional: Negative    CV: Negative    Pulm: Negative    GI: Negative    Urinary: Negative    Neuro: Negative    Musculoskeletal: Negative      Labs:    Recent Results (from the past 24 hours)   CBC    Collection Time: 07/16/25  8:03 AM   Result Value Ref Range    WBC 6.81 4.31 - 10.16 Thousand/uL    RBC 4.20 3.81 - 5.12 Million/uL    Hemoglobin 10.8 (L) 11.5 - 15.4 g/dL    Hematocrit 33.3 (L) 34.8 - 46.1 %    MCV 79 (L) 82 -  "98 fL    MCH 25.7 (L) 26.8 - 34.3 pg    MCHC 32.4 31.4 - 37.4 g/dL    RDW 13.6 11.6 - 15.1 %    Platelets 168 149 - 390 Thousands/uL    MPV 11.0 8.9 - 12.7 fL         Obstetric History:    G 3 P 1011  1  7 lb 5 oz    Labs:  Strep Grp B PCR   Date Value Ref Range Status   2025 Negative Negative Final     Comment:           Type & Screen:  ABO Grouping   Date Value Ref Range Status   2025 O  Final      Rh Factor   Date Value Ref Range Status   2025 Negative  Final    No results found for: \"ANTIBODYSCR\"    Specimen Expiration Date   Date Value Ref Range Status   202550116  Final     No results found for: \"HIVAGAB\"  Hepatitis B Surface Ag   Date Value Ref Range Status   2025 Non-reactive Non-Reactive Final     No results found for: \"RPR\"  Rubella IgG Quant   Date Value Ref Range Status   2025 33.0 >14.9 IU/mL Final     Glucose   Date Value Ref Range Status   04/15/2025 128 70 - 134 mg/dL Final     Comment:     <=134 Normal   135-179 Impaired glucose fasting. Perform 3 Hour Glucose Tolerance   >=180 Diagnosis Gestational Diabetes Mellitus           Physical Exam:  Vital signs:    Vitals:    25 0750   BP: 121/79   Pulse: (!) 110   Resp: 16   Temp: 98.3 °F (36.8 °C)   SpO2: 97%          Constitutional:       General: She is not in acute distress.     Appearance: Normal appearance.   HENT:      Head: Normocephalic.      Nose: Nose normal.   Eyes:      General: No scleral icterus.  Cardiovascular:      Pulses: Normal pulses.   Pulmonary:      Effort: Pulmonary effort is normal. No respiratory distress.      Breath sounds: No wheezing.   Abdominal:      General: Bowel sounds are normal. There is no distension.      Palpations: Abdomen is soft.Uterine size appropriate for gestational age     Tenderness: There is no abdominal tenderness. There is no guarding.   Musculoskeletal:      Cervical back: Neck supple.      Right lower leg: No edema.      Left lower leg: No edema. "   Skin:     General: Skin is warm.      Capillary Refill: Capillary refill takes less than 2 seconds.   Neurological:      Mental Status: She is alert and oriented to person, place, and time.   Psychiatric:         Mood and Affect: Mood normal.      Cervix: 3 cm  20% -2  FHR: cat 1  CTXN: occasional    Bedside US confirms vertex presentation         [1]   Patient Active Problem List  Diagnosis    Short interval between pregnancies complicating pregnancy, antepartum    Family history of breast cancer    24 weeks gestation of pregnancy    Single umbilical artery affecting management of mother, antepartum, single gestation    Spotting during pregnancy in third trimester   [2] No past medical history on file.  [3] No past surgical history on file.  [4]   No current facility-administered medications on file prior to encounter.     Current Outpatient Medications on File Prior to Encounter   Medication Sig Dispense Refill    MELATONIN PO Take 2.5 mg by mouth as needed      Prenatal-FeFum-FA-DHA w/o A (PRENATAL + DHA PO) Take by mouth     [5] No Known Allergies

## 2025-07-16 NOTE — ANESTHESIA PROCEDURE NOTES
Epidural Block    Patient location during procedure: OB/L&D  Start time: 7/16/2025 5:23 PM  Reason for block: procedure for pain and at surgeon's request  Staffing  Performed by: Abdelrahman Pulliam DO  Authorized by: Abdelrahman Pulliam DO    Preanesthetic Checklist  Completed: patient identified, IV checked, site marked, risks and benefits discussed, surgical consent, monitors and equipment checked, pre-op evaluation and timeout performed  Epidural  Patient position: sitting  Prep: ChloraPrep and site prepped and draped  Sedation Level: no sedation  Patient monitoring: continuous pulse oximetry, frequent blood pressure checks and heart rate  Approach: midline  Location: lumbar, L3-4  Injection technique: AGUILAR air  Needle  Needle type: Tuohy   Needle gauge: 17 G  Needle insertion depth: 4 cm  Catheter type: spring wound  Catheter size: 19 G  Catheter at skin depth: 9 cm  Catheter securement method: clear occlusive dressing, stabilization device and tape  Test dose: negative  Assessment  Sensory level: T10  Number of attempts: 1negative aspiration for CSF, negative aspiration for heme and no paresthesia on injection  patient tolerated the procedure well with no immediate complications

## 2025-07-16 NOTE — PLAN OF CARE
Problem: PAIN - ADULT  Goal: Verbalizes/displays adequate comfort level or baseline comfort level  Description: Interventions:  - Encourage patient to monitor pain and request assistance  - Assess pain using appropriate pain scale  - Administer analgesics as ordered based on type and severity of pain and evaluate response  - Implement non-pharmacological measures as appropriate and evaluate response  - Consider cultural and social influences on pain and pain management  - Notify physician/advanced practitioner if interventions unsuccessful or patient reports new pain  - Educate patient/family on pain management process including their role and importance of  reporting pain   - Provide non-pharmacologic/complimentary pain relief interventions  Outcome: Progressing     Problem: INFECTION - ADULT  Goal: Absence or prevention of progression during hospitalization  Description: INTERVENTIONS:  - Assess and monitor for signs and symptoms of infection  - Monitor lab/diagnostic results  - Monitor all insertion sites, i.e. indwelling lines, tubes, and drains  - Monitor endotracheal if appropriate and nasal secretions for changes in amount and color  - [place] appropriate cooling/warming therapies per order  - Administer medications as ordered  - Instruct and encourage patient and family to use good hand hygiene technique  - Identify and instruct in appropriate isolation precautions for identified infection/condition  Outcome: Progressing  Goal: Absence of fever/infection during neutropenic period  Description: INTERVENTIONS:  - Monitor WBC  - Perform strict hand hygiene  - Limit to healthy visitors only  - No plants, dried, fresh or silk flowers with yan in patient room  Outcome: Progressing     Problem: SAFETY ADULT  Goal: Patient will remain free of falls  Description: INTERVENTIONS:  - Educate patient/family on patient safety including physical limitations  - Instruct patient to call for assistance with activity   -  Consider consulting OT/PT to assist with strengthening/mobility based on AM PAC & JH-HLM score  - Consult OT/PT to assist with strengthening/mobility   - Keep Call bell within reach  - Keep bed low and locked with side rails adjusted as appropriate  - Keep care items and personal belongings within reach  - Initiate and maintain comfort rounds  - Make Fall Risk Sign visible to staff  - Offer Toileting every  Hours, in advance of need  - Initiate/Maintain alarm  - Obtain necessary fall risk management equipment:   - Apply yellow socks and bracelet for high fall risk patients  - Consider moving patient to room near nurses station  Outcome: Progressing  Goal: Maintain or return to baseline ADL function  Description: INTERVENTIONS:  -  Assess patient's ability to carry out ADLs; assess patient's baseline for ADL function and identify physical deficits which impact ability to perform ADLs (bathing, care of mouth/teeth, toileting, grooming, dressing, etc.)  - Assess/evaluate cause of self-care deficits   - Assess range of motion  - Assess patient's mobility; develop plan if impaired  - Assess patient's need for assistive devices and provide as appropriate  - Encourage maximum independence but intervene and supervise when necessary  - Involve family in performance of ADLs  - Assess for home care needs following discharge   - Consider OT consult to assist with ADL evaluation and planning for discharge  - Provide patient education as appropriate  - Monitor functional capacity and physical performance, use of AM PAC & JH-HLM   - Monitor gait, balance and fatigue with ambulation    Outcome: Progressing  Goal: Maintains/Returns to pre admission functional level  Description: INTERVENTIONS:  - Perform AM-PAC 6 Click Basic Mobility/ Daily Activity assessment daily.  - Set and communicate daily mobility goal to care team and patient/family/caregiver.   - Collaborate with rehabilitation services on mobility goals if consulted  -  Perform Range of Motion  times a day.  - Reposition patient every  hours.  - Dangle patient  times a day  - Stand patient  times a day  - Ambulate patient  times a day  - Out of bed to chair  times a day   - Out of bed for meals  times a day  - Out of bed for toileting  - Record patient progress and toleration of activity level   Outcome: Progressing     Problem: DISCHARGE PLANNING  Goal: Discharge to home or other facility with appropriate resources  Description: INTERVENTIONS:  - Identify barriers to discharge w/patient and caregiver  - Arrange for needed discharge resources and transportation as appropriate  - Identify discharge learning needs (meds, wound care, etc.)  - Arrange for interpretive services to assist at discharge as needed  - Refer to Case Management Department for coordinating discharge planning if the patient needs post-hospital services based on physician/advanced practitioner order or complex needs related to functional status, cognitive ability, or social support system  Outcome: Progressing     Problem: Knowledge Deficit  Goal: Patient/family/caregiver demonstrates understanding of disease process, treatment plan, medications, and discharge instructions  Description: Complete learning assessment and assess knowledge base.  Interventions:  - Provide teaching at level of understanding  - Provide teaching via preferred learning methods  Outcome: Progressing

## 2025-07-16 NOTE — ANESTHESIA PREPROCEDURE EVALUATION
Procedure:  LABOR ANALGESIA    Relevant Problems   CARDIO   (+) Single umbilical artery affecting management of mother, antepartum, single gestation      Obstetrics/Gynecology   (+) Short interval between pregnancies complicating pregnancy, antepartum        Physical Exam    Airway     Mallampati score: II  TM Distance: >3 FB  Neck ROM: full  Mouth opening: >= 4 cm      Cardiovascular      Dental   No notable dental hx     Pulmonary      Neurological  - normal exam    Other Findings  post-pubertal.      Anesthesia Plan  ASA Score- 2     Anesthesia Type- epidural with ASA Monitors.         Additional Monitors:     Airway Plan:            Plan Factors-    Chart reviewed.   Existing labs reviewed. Patient summary reviewed.    Patient is not a current smoker.              Induction-     Postoperative Plan- .   Monitoring Plan - Monitoring plan - standard ASA monitoring      Perioperative Resuscitation Plan - Level 1 - Full Code.       Informed Consent- Anesthetic plan and risks discussed with patient and spouse.        NPO Status:  Vitals Value Taken Time   Date of last liquid 07/16/25 07/16/25 07:50   Time of last liquid 0752 07/16/25 07:50   Date of last solid 07/16/25 07/16/25 07:50   Time of last solid 0630 07/16/25 07:50

## 2025-07-16 NOTE — OB LABOR/OXYTOCIN SAFETY PROGRESS
Oxytocin Safety Progress Check Note - Ana Luisa Penny 30 y.o. female MRN: [id number]    Unit/Bed#: -01 Encounter: [id number]    Dose (mark-units/min) Oxytocin: 6 mark-units/min  Contraction Frequency (minutes): 1-2  Contraction Intensity: Moderate/Strong  Uterine Activity Characteristics: Regular  Cervical Dilation: 5-6        Cervical Effacement: 90  Fetal Station: -2  Baseline Rate (FHR): 140 bpm     FHR Category: 1               Vital Signs:   Vitals:    07/16/25 1745   BP: 118/75   Pulse: 68   Resp:    Temp:    SpO2: 100%       Notes/comments:     Patient s/p epidural, still not 100% comfortable.  Continue position changes.  Not yet ready for AROM.    present for labor support.      Jossy Johnston MD 7/16/2025 5:54 PM

## 2025-07-16 NOTE — OB LABOR/OXYTOCIN SAFETY PROGRESS
Oxytocin Safety Progress Check Note - Ana Luisa Penny 30 y.o. female MRN: [id number]    Unit/Bed#: -01 Encounter: [id number]    Dose (mark-units/min) Oxytocin: 6 mark-units/min  Contraction Frequency (minutes): 1-2  Contraction Intensity: Moderate/Strong  Uterine Activity Characteristics: Regular  Cervical Dilation: 7        Cervical Effacement: 100  Fetal Station: 0  Baseline Rate (FHR): 135 bpm     FHR Category: 1               Vital Signs:   Vitals:    07/16/25 1828   BP: 111/65   Pulse: 82   Resp:    Temp:    SpO2:        Notes/comments:   Starting to feel pressure.      Jossy Johnston MD 7/16/2025 6:40 PM

## 2025-07-17 PROBLEM — O26.899 RH NEGATIVE, MATERNAL: Status: ACTIVE | Noted: 2023-03-05

## 2025-07-17 LAB
BASOPHILS # BLD AUTO: 0.03 THOUSANDS/ÂΜL (ref 0–0.1)
BASOPHILS NFR BLD AUTO: 0 % (ref 0–1)
EOSINOPHIL # BLD AUTO: 0.01 THOUSAND/ÂΜL (ref 0–0.61)
EOSINOPHIL NFR BLD AUTO: 0 % (ref 0–6)
ERYTHROCYTE [DISTWIDTH] IN BLOOD BY AUTOMATED COUNT: 13.9 % (ref 11.6–15.1)
HCT VFR BLD AUTO: 30.4 % (ref 34.8–46.1)
HGB BLD-MCNC: 9.8 G/DL (ref 11.5–15.4)
IMM GRANULOCYTES # BLD AUTO: 0.13 THOUSAND/UL (ref 0–0.2)
IMM GRANULOCYTES NFR BLD AUTO: 1 % (ref 0–2)
LYMPHOCYTES # BLD AUTO: 1.29 THOUSANDS/ÂΜL (ref 0.6–4.47)
LYMPHOCYTES NFR BLD AUTO: 9 % (ref 14–44)
MCH RBC QN AUTO: 25.6 PG (ref 26.8–34.3)
MCHC RBC AUTO-ENTMCNC: 32.2 G/DL (ref 31.4–37.4)
MCV RBC AUTO: 79 FL (ref 82–98)
MONOCYTES # BLD AUTO: 1.16 THOUSAND/ÂΜL (ref 0.17–1.22)
MONOCYTES NFR BLD AUTO: 8 % (ref 4–12)
NEUTROPHILS # BLD AUTO: 12.01 THOUSANDS/ÂΜL (ref 1.85–7.62)
NEUTS SEG NFR BLD AUTO: 82 % (ref 43–75)
NRBC BLD AUTO-RTO: 0 /100 WBCS
PLATELET # BLD AUTO: 169 THOUSANDS/UL (ref 149–390)
PMV BLD AUTO: 10.8 FL (ref 8.9–12.7)
RBC # BLD AUTO: 3.83 MILLION/UL (ref 3.81–5.12)
WBC # BLD AUTO: 14.63 THOUSAND/UL (ref 4.31–10.16)

## 2025-07-17 PROCEDURE — 85025 COMPLETE CBC W/AUTO DIFF WBC: CPT | Performed by: OBSTETRICS & GYNECOLOGY

## 2025-07-17 PROCEDURE — 99024 POSTOP FOLLOW-UP VISIT: CPT | Performed by: OBSTETRICS & GYNECOLOGY

## 2025-07-17 RX ORDER — DIPHENHYDRAMINE HYDROCHLORIDE 50 MG/ML
25 INJECTION, SOLUTION INTRAMUSCULAR; INTRAVENOUS EVERY 6 HOURS PRN
Status: DISCONTINUED | OUTPATIENT
Start: 2025-07-17 | End: 2025-07-18 | Stop reason: HOSPADM

## 2025-07-17 RX ORDER — ONDANSETRON 2 MG/ML
4 INJECTION INTRAMUSCULAR; INTRAVENOUS EVERY 8 HOURS PRN
Status: DISCONTINUED | OUTPATIENT
Start: 2025-07-17 | End: 2025-07-18 | Stop reason: HOSPADM

## 2025-07-17 RX ORDER — ACETAMINOPHEN 325 MG/1
650 TABLET ORAL EVERY 4 HOURS PRN
Status: DISCONTINUED | OUTPATIENT
Start: 2025-07-17 | End: 2025-07-18 | Stop reason: HOSPADM

## 2025-07-17 RX ORDER — DOCUSATE SODIUM 100 MG/1
100 CAPSULE, LIQUID FILLED ORAL 2 TIMES DAILY
Status: DISCONTINUED | OUTPATIENT
Start: 2025-07-17 | End: 2025-07-18 | Stop reason: HOSPADM

## 2025-07-17 RX ORDER — CALCIUM CARBONATE 500 MG/1
1000 TABLET, CHEWABLE ORAL DAILY PRN
Status: DISCONTINUED | OUTPATIENT
Start: 2025-07-17 | End: 2025-07-18 | Stop reason: HOSPADM

## 2025-07-17 RX ORDER — IBUPROFEN 600 MG/1
600 TABLET, FILM COATED ORAL EVERY 6 HOURS PRN
Status: DISCONTINUED | OUTPATIENT
Start: 2025-07-17 | End: 2025-07-18 | Stop reason: HOSPADM

## 2025-07-17 RX ORDER — OXYTOCIN/0.9 % SODIUM CHLORIDE 30/500 ML
250 PLASTIC BAG, INJECTION (ML) INTRAVENOUS ONCE
Status: DISCONTINUED | OUTPATIENT
Start: 2025-07-17 | End: 2025-07-18 | Stop reason: HOSPADM

## 2025-07-17 RX ORDER — BENZOCAINE/MENTHOL 6 MG-10 MG
1 LOZENGE MUCOUS MEMBRANE DAILY PRN
Status: DISCONTINUED | OUTPATIENT
Start: 2025-07-17 | End: 2025-07-18 | Stop reason: HOSPADM

## 2025-07-17 RX ADMIN — IBUPROFEN 600 MG: 600 TABLET ORAL at 23:45

## 2025-07-17 RX ADMIN — ACETAMINOPHEN 650 MG: 325 TABLET, FILM COATED ORAL at 08:56

## 2025-07-17 RX ADMIN — IBUPROFEN 600 MG: 600 TABLET ORAL at 13:24

## 2025-07-17 RX ADMIN — DOCUSATE SODIUM 100 MG: 100 CAPSULE, LIQUID FILLED ORAL at 18:39

## 2025-07-17 RX ADMIN — ACETAMINOPHEN 650 MG: 325 TABLET, FILM COATED ORAL at 18:38

## 2025-07-17 RX ADMIN — BENZOCAINE AND LEVOMENTHOL 1 APPLICATION: 200; 5 SPRAY TOPICAL at 02:45

## 2025-07-17 RX ADMIN — HYDROCORTISONE 1 APPLICATION: 1 CREAM TOPICAL at 02:45

## 2025-07-17 RX ADMIN — DOCUSATE SODIUM 100 MG: 100 CAPSULE, LIQUID FILLED ORAL at 08:57

## 2025-07-17 RX ADMIN — IBUPROFEN 600 MG: 600 TABLET ORAL at 02:45

## 2025-07-17 NOTE — PLAN OF CARE
Problem: PAIN - ADULT  Goal: Verbalizes/displays adequate comfort level or baseline comfort level  Description: Interventions:  - Encourage patient to monitor pain and request assistance  - Assess pain using appropriate pain scale  - Administer analgesics as ordered based on type and severity of pain and evaluate response  - Implement non-pharmacological measures as appropriate and evaluate response  - Consider cultural and social influences on pain and pain management  - Notify physician/advanced practitioner if interventions unsuccessful or patient reports new pain  - Educate patient/family on pain management process including their role and importance of  reporting pain   - Provide non-pharmacologic/complimentary pain relief interventions  Outcome: Progressing     Problem: INFECTION - ADULT  Goal: Absence or prevention of progression during hospitalization  Description: INTERVENTIONS:  - Assess and monitor for signs and symptoms of infection  - Monitor lab/diagnostic results  - Monitor all insertion sites, i.e. indwelling lines, tubes, and drains  - Monitor endotracheal if appropriate and nasal secretions for changes in amount and color  - [place] appropriate cooling/warming therapies per order  - Administer medications as ordered  - Instruct and encourage patient and family to use good hand hygiene technique  - Identify and instruct in appropriate isolation precautions for identified infection/condition  Outcome: Progressing  Goal: Absence of fever/infection during neutropenic period  Description: INTERVENTIONS:  - Monitor WBC  - Perform strict hand hygiene  - Limit to healthy visitors only  - No plants, dried, fresh or silk flowers with yan in patient room  Outcome: Progressing     Problem: SAFETY ADULT  Goal: Patient will remain free of falls  Description: INTERVENTIONS:  - Educate patient/family on patient safety including physical limitations  - Instruct patient to call for assistance with activity   -  Consider consulting OT/PT to assist with strengthening/mobility based on AM PAC & JH-HLM score  - Consult OT/PT to assist with strengthening/mobility   - Keep Call bell within reach  - Keep bed low and locked with side rails adjusted as appropriate  - Keep care items and personal belongings within reach  - Initiate and maintain comfort rounds  - Make Fall Risk Sign visible to staff  - Offer Toileting every  Hours, in advance of need  - Initiate/Maintain alarm  - Obtain necessary fall risk management equipment:   - Apply yellow socks and bracelet for high fall risk patients  - Consider moving patient to room near nurses station  Outcome: Progressing  Goal: Maintain or return to baseline ADL function  Description: INTERVENTIONS:  -  Assess patient's ability to carry out ADLs; assess patient's baseline for ADL function and identify physical deficits which impact ability to perform ADLs (bathing, care of mouth/teeth, toileting, grooming, dressing, etc.)  - Assess/evaluate cause of self-care deficits   - Assess range of motion  - Assess patient's mobility; develop plan if impaired  - Assess patient's need for assistive devices and provide as appropriate  - Encourage maximum independence but intervene and supervise when necessary  - Involve family in performance of ADLs  - Assess for home care needs following discharge   - Consider OT consult to assist with ADL evaluation and planning for discharge  - Provide patient education as appropriate  - Monitor functional capacity and physical performance, use of AM PAC & JH-HLM   - Monitor gait, balance and fatigue with ambulation    Outcome: Progressing  Goal: Maintains/Returns to pre admission functional level  Description: INTERVENTIONS:  - Perform AM-PAC 6 Click Basic Mobility/ Daily Activity assessment daily.  - Set and communicate daily mobility goal to care team and patient/family/caregiver.   - Collaborate with rehabilitation services on mobility goals if consulted  -  Perform Range of Motion  times a day.  - Reposition patient every  hours.  - Dangle patient  times a day  - Stand patient  times a day  - Ambulate patient  times a day  - Out of bed to chair  times a day   - Out of bed for meals  times a day  - Out of bed for toileting  - Record patient progress and toleration of activity level   Outcome: Progressing     Problem: DISCHARGE PLANNING  Goal: Discharge to home or other facility with appropriate resources  Description: INTERVENTIONS:  - Identify barriers to discharge w/patient and caregiver  - Arrange for needed discharge resources and transportation as appropriate  - Identify discharge learning needs (meds, wound care, etc.)  - Arrange for interpretive services to assist at discharge as needed  - Refer to Case Management Department for coordinating discharge planning if the patient needs post-hospital services based on physician/advanced practitioner order or complex needs related to functional status, cognitive ability, or social support system  Outcome: Progressing     Problem: Knowledge Deficit  Goal: Patient/family/caregiver demonstrates understanding of disease process, treatment plan, medications, and discharge instructions  Description: Complete learning assessment and assess knowledge base.  Interventions:  - Provide teaching at level of understanding  - Provide teaching via preferred learning methods  Outcome: Progressing     Problem: POSTPARTUM  Goal: Experiences normal postpartum course  Description: INTERVENTIONS:  - Monitor maternal vital signs  - Assess uterine involution and lochia  Outcome: Progressing  Goal: Appropriate maternal -  bonding  Description: INTERVENTIONS:  - Identify family support  - Assess for appropriate maternal/infant bonding   -Encourage maternal/infant bonding opportunities  - Referral to  or  as needed  Outcome: Progressing  Goal: Establishment of infant feeding pattern  Description: INTERVENTIONS:  -  Assess breast/bottle feeding  - Refer to lactation as needed  Outcome: Progressing  Goal: Incision(s), wounds(s) or drain site(s) healing without S/S of infection  Description: INTERVENTIONS  - Assess and document dressing, incision, wound bed, drain sites and surrounding tissue  - Provide patient and family education  - Perform skin care/dressing changes every   Outcome: Progressing

## 2025-07-17 NOTE — OB LABOR/OXYTOCIN SAFETY PROGRESS
Oxytocin Safety Progress Check Note - Ana Luisa TIMOTHY Mazaruss 30 y.o. female MRN: [id number]    Unit/Bed#: -01 Encounter: [id number]    Dose (mark-units/min) Oxytocin: 6 mark-units/min  Contraction Frequency (minutes): 1-3  Contraction Intensity: Moderate/Strong  Uterine Activity Characteristics: Regular  Cervical Dilation: Lip/rim (Comment)        Cervical Effacement: 100  Fetal Station: 1  Baseline Rate (FHR): 130 bpm     FHR Category: 1               Vital Signs:   Vitals:    07/16/25 2008   BP: 111/69   Pulse: 70   Resp:    Temp:    SpO2:        Notes/comments:     Patient feeling intermittent pressure.  Excellent cervical change noted.  Small but thick anterior lip present.      Jossy Johnston MD 7/16/2025 10:03 PM

## 2025-07-17 NOTE — ANESTHESIA POSTPROCEDURE EVALUATION
Post-Op Assessment Note    CV Status:  Stable    Pain management: adequate      Post-op block assessment: site cleaned, catheter intact and no complications   Mental Status:  Alert and awake   Hydration Status:  Euvolemic and stable   PONV Controlled:  None   Airway Patency:  Patent     Post Op Vitals Reviewed: Yes    No anethesia notable event occurred.    Staff: Anesthesiologist           Last Filed PACU Vitals:  Vitals Value Taken Time   Temp     Pulse 77 07/17/25 01:35   /76 07/17/25 01:35   Resp     SpO2     Vitals shown include unfiled device data.

## 2025-07-17 NOTE — ASSESSMENT & PLAN NOTE
A/P.  30 y.o.  PPD#1 s/p delivery (Vaginal, Spontaneous) at 39w3d of 3780 g (8 lb 5.3 oz) male , apgars 8 /9 .  (1) PPD#1.  Delivered 2025 11:06 PM.  Doing well.  --> Routine postpartum care.

## 2025-07-17 NOTE — PROGRESS NOTES
"Progress Note - OB/GYN   Name: Ana Luisa Penny 30 y.o. female I MRN: [id number]  Unit/Bed#: -01 I Date of Admission: 2025   Date of Service: 2025 I Hospital Day: 1     Assessment & Plan  Spontaneous vaginal delivery  A/P.  30 y.o.  PPD#1 s/p delivery (Vaginal, Spontaneous) at 39w3d of 3780 g (8 lb 5.3 oz) male , apgars 8 /9 .  (1) PPD#1.  Delivered 2025 11:06 PM.  Doing well.  --> Routine postpartum care.  Rh negative, maternal  Rh negative.  Infant also Rh negative.  --> RhoGAM not indicated.  Postpartum anemia  Postpartum anemia.  Hgb 9.8g/dL.  --> Oral iron.    Brent Tovar MD  25  ---------------------------------------------------------------------------------------------    Subjective/    Feels well.  Tolerating po, ambulating, voiding without difficulty.  Happy.  Breastfeeding/bonding.  Mild cramps, appropriate lochia.    Objective/  Blood pressure 108/67, pulse 69, temperature 97.7 °F (36.5 °C), temperature source Oral, resp. rate 18, height 5' 3\" (1.6 m), weight 70.1 kg (154 lb 9.6 oz), last menstrual period 10/08/2024, SpO2 98%, currently breastfeeding.    Patient Vitals for the past 24 hrs:   BP Temp Temp src Pulse Resp SpO2   25 0754 108/67 97.7 °F (36.5 °C) Oral 69 18 98 %   25 2309 112/80 -- -- -- -- --   25 2300 112/80 -- -- (!) 121 -- --   25 2203 123/82 -- -- 73 -- --   25 123/82 -- -- 73 -- --   25 115/72 98.2 °F (36.8 °C) -- 82 -- --   25 118/77 -- -- 76 -- --   25 111/69 -- -- 70 -- --   25 118/74 98 °F (36.7 °C) -- 69 -- --   25 112/72 -- -- 78 -- --   25 112/64 -- -- 82 -- --   25 105/71 -- -- 77 -- --   25 183 111/62 -- -- 82 -- --   25 182 111/65 -- -- 82 -- --   25 107/65 -- -- 77 -- --   25 118/75 -- -- 68 -- 100 %   25 114/72 -- -- 74 -- --   25 1735 117/64 -- -- 73 -- 100 % "   07/16/25 1732 114/57 -- -- -- -- --   07/16/25 1730 -- -- -- 76 -- 100 %   07/16/25 1728 131/71 -- -- -- -- --   07/16/25 1725 -- -- -- 63 -- 100 %   07/16/25 1724 122/66 -- -- 72 -- --   07/16/25 1720 -- -- -- 72 -- 95 %   07/16/25 1606 -- -- -- 69 -- 98 %   07/16/25 1539 118/71 97.7 °F (36.5 °C) Oral 72 16 --   07/16/25 1257 118/76 97.6 °F (36.4 °C) Oral 68 16 --       Physical Exam/      General:  Alert, comfortable, NAD      Cardiovascular: Regular rate and rhythm      Respiratory: Clear to auscultation bilaterally.      Abdomen: Soft, non-tender, non-distended      Fundus: Firm, below umbilicus, nontender      Extremities: Warm, non-tender      Labs/      Blood type:  O-/-      Rubella: Immune      Lab Results   Component Value Date    WBC 14.63 (H) 07/17/2025    HGB 9.8 (L) 07/17/2025    HCT 30.4 (L) 07/17/2025    MCV 79 (L) 07/17/2025     07/17/2025

## 2025-07-17 NOTE — L&D DELIVERY NOTE
DELIVERY NOTE  Ana Luisa Penny 30 y.o. female MRN: [id number]  Unit/Bed#: -01 Encounter: [id number]    Obstetrician:   MD Jessica    Pre-Delivery Diagnosis: Term pregnancy  Induced labor  Single fetus    Post-Delivery Diagnosis: Same as above - Delivered  Viable male fetus  1st degree laceration    Procedure: Spontaneous vaginal delivery    Quantified Blood Loss:  181ml           Anesthesia: epidural    Complications:  None    Specimens: cord blood, arterial and venous cord gases, placenta to storage    Description of Delivery:     After pushing for 12 minutes the patient delivered a viable Male  over intact perineum and/or 1st degree laceration. A nuchal cord was noted and reduced.   The fetal arm also delivered with the fetal head. With the assistance of maternal expulsive efforts and downward traction of fetal head, the anterior shoulder was delivered without difficulty, followed by the remainder of the infant's body.  The infant was then placed on the mothers abdomen.  A nurse resuscitator was present at bedside to assess the .    The umbilical cord was then doubly clamped and cut.  Umbilical cord blood and umbilical artery and venous gases were collected. Active management of the third stage of labor was undertaken using IV pitocin and massage.  Placenta was delivered with fundal massage and gentle traction on the cord.  A portion of cord was kept for storage for future tissue study if required.  Placenta delivered intact with a 3-vessel cord.  Bleeding was noted to be under control.    Inspection of the vagina, cervix, perineum and rectum was performed. A 1st degree was identified which was then repaired in standard fashion with 3-0 Vicryl rapid.  A figure of 8 suture of the same material was placed to re-approximate the right labial laceration.   The laceration(s) showed good tissue reapproximation and hemostasis.    Mother and baby are currently recovering nicely in stable condition.            I was present and participated in key portions of the entire procedure.    Recent Results (from the past hour)   CORD, Blood gas, arterial    Collection Time: 07/16/25 11:07 PM   Result Value Ref Range    pH, Cord Art 7.230 7.230 - 7.430    pCO2, Cord Art 57.0 30.0 - 60.0    pO2, Cord Art 35.0 (H) 5.0 - 25.0 mm HG    HCO3, Cord Art 23.3 17.3 - 27.3 mmol/L    Base Exc, Cord Art -5.3 (L) 3.0 - 11.0 mmol/L    O2 Content, Cord Art 16.9 ml/dl    O2 Hgb, Arterial Cord 71.0 %   CORD, Blood gas, venous    Collection Time: 07/16/25 11:07 PM   Result Value Ref Range    pH, Cord Eric 7.328 7.190 - 7.490    pCO2, Cord Eric 39.9 27.0 - 43.0 mm HG    pO2, Cord Eric 19.1 15.0 - 45.0 mm HG    HCO3, Cord Eric 20.5 12.2 - 28.6 mmol/L    Base Exc, Cord Eric -5.1 (L) 1.0 - 9.0 mmol/L    O2 Cont, Cord Eric 9.8 mL/dL    O2 HGB,VENOUS CORD 43.5 %

## 2025-07-18 VITALS
SYSTOLIC BLOOD PRESSURE: 100 MMHG | RESPIRATION RATE: 17 BRPM | TEMPERATURE: 97.7 F | BODY MASS INDEX: 27.39 KG/M2 | WEIGHT: 154.6 LBS | HEIGHT: 63 IN | HEART RATE: 64 BPM | OXYGEN SATURATION: 99 % | DIASTOLIC BLOOD PRESSURE: 57 MMHG

## 2025-07-18 PROCEDURE — 99024 POSTOP FOLLOW-UP VISIT: CPT | Performed by: OBSTETRICS & GYNECOLOGY

## 2025-07-18 PROCEDURE — NC001 PR NO CHARGE: Performed by: OBSTETRICS & GYNECOLOGY

## 2025-07-18 RX ORDER — IBUPROFEN 600 MG/1
600 TABLET, FILM COATED ORAL EVERY 6 HOURS PRN
Qty: 30 TABLET | Refills: 0 | Status: SHIPPED | OUTPATIENT
Start: 2025-07-18

## 2025-07-18 RX ORDER — FERROUS SULFATE 325(65) MG
325 TABLET ORAL
Qty: 30 TABLET | Refills: 0 | Status: SHIPPED | OUTPATIENT
Start: 2025-07-18

## 2025-07-18 RX ORDER — FERROUS SULFATE 325(65) MG
325 TABLET ORAL
Status: DISCONTINUED | OUTPATIENT
Start: 2025-07-18 | End: 2025-07-18 | Stop reason: HOSPADM

## 2025-07-18 RX ORDER — DOCUSATE SODIUM 100 MG/1
100 CAPSULE, LIQUID FILLED ORAL 2 TIMES DAILY PRN
Qty: 30 CAPSULE | Refills: 0 | Status: SHIPPED | OUTPATIENT
Start: 2025-07-18

## 2025-07-18 RX ADMIN — ACETAMINOPHEN 650 MG: 325 TABLET, FILM COATED ORAL at 02:28

## 2025-07-18 RX ADMIN — IBUPROFEN 600 MG: 600 TABLET ORAL at 05:54

## 2025-07-18 RX ADMIN — ACETAMINOPHEN 650 MG: 325 TABLET, FILM COATED ORAL at 11:20

## 2025-07-18 RX ADMIN — DOCUSATE SODIUM 100 MG: 100 CAPSULE, LIQUID FILLED ORAL at 07:59

## 2025-07-18 RX ADMIN — FERROUS SULFATE TAB 325 MG (65 MG ELEMENTAL FE) 325 MG: 325 (65 FE) TAB at 07:59

## 2025-07-18 NOTE — DISCHARGE SUMMARY
Discharge Summary - OB/GYN   Name: Ana Luisa Penny 30 y.o. female I MRN: [id number]  Unit/Bed#: -01 I Date of Admission: 2025   Date of Service: 2025 I Hospital Day: 2    ADMISSION  Patient of: [facility] [place]  Admission Date: 2025   Admitting Attending: Dr. Jossy Lopez MD  Admitting Diagnoses: Problem List[1]    DELIVERY  Delivery Method: Vaginal, Spontaneous   Delivery Date and Time: 2025 11:06 PM  Delivery Attending: Jossy Lopez    DISCHARGE  Discharge Date: 25  Discharge Attending: Dr. Tovar  Discharge Diagnosis:   Same, Delivered    Clinical course: Admission to Delivery  Ana Luisa Penny is a 30 y.o.  who was admitted at 39w3d for induction.    Reason for induction: Elective.     She progressed to complete and began pushing.    Delivery  Route of Delivery: Vaginal, Spontaneous    Anesthesia: Epidural,   QBL: Non-Surgical QBL (mL): 181        Delivery: Vaginal, Spontaneous at 2025 11:06 PM  Laceration: Perineal: 1° Repaired? Yes    Baby's Weight: 3780 g (8 lb 5.3 oz); 133.33    Apgar scores: 8  and 9  at 1 and 5 minutes, respectively    Clinical Course: Post-Delivery:  The post delivery course was unremarkable.    On the day of discharge, the patient was ambulating, voiding spontaneously, tolerating oral intake, and hemodynamically stable. She was able to reasonably perform all ADLs. She had appropriate bowel function. Pain was well-controlled. She was discharged home on postpartum/postop day #2 without complications. Patient was instructed to follow up with her OB as an outpatient and was given appropriate warnings to call her provider with problems or concerns.    Pertinent lab findings included:   Blood type O-.     Last three Hgb values:  Lab Results   Component Value Date    HGB 9.8 (L) 2025    HGB 10.8 (L) 2025    HGB 12.2 04/15/2025        Problem-specific follow-up plans included the following:  Assessment &  Plan  Spontaneous vaginal delivery  A/P.  30 y.o.  PPD#2 s/p delivery (Vaginal, Spontaneous) at 39w3d of 3780 g (8 lb 5.3 oz) male , apgars 8 /9 .  (1) PPD#2.  Delivered 2025 11:06 PM.  Doing well.  --> Discharge home.  Rh negative, maternal  Rh negative.  Infant also Rh negative.  --> RhoGAM not indicated.  Postpartum anemia  Postpartum anemia.  Hgb 9.8g/dL.  --> Continue oral iron.      Discharge med list:   Medication List      START taking these medications     docusate sodium 100 mg capsule; Commonly known as: COLACE; Take 1   capsule (100 mg total) by mouth 2 (two) times a day as needed for   constipation   ferrous sulfate 325 (65 Fe) mg tablet; Take 1 tablet (325 mg total) by   mouth daily with breakfast   ibuprofen 600 mg tablet; Commonly known as: MOTRIN; Take 1 tablet (600   mg total) by mouth every 6 (six) hours as needed for moderate pain     CONTINUE taking these medications     MELATONIN PO   PRENATAL + DHA PO       Condition at discharge:   good     Disposition:   Home    Planned Readmission:   No    Discharge Statement:  I have spent a total time of 45 minutes in caring for this patient on the day of the visit/encounter. >30 minutes of time was spent on: Risk factor reductions, Impressions, Counseling / Coordination of care, Documenting in the medical record, and Reviewing / ordering tests, medicine, procedures  .       [1]   Patient Active Problem List  Diagnosis    Rh negative, maternal    Short interval between pregnancies complicating pregnancy, antepartum    Family history of breast cancer    24 weeks gestation of pregnancy    Single umbilical artery affecting management of mother, antepartum, single gestation    Spotting during pregnancy in third trimester    Spontaneous vaginal delivery    Postpartum anemia

## 2025-07-18 NOTE — PLAN OF CARE
Problem: PAIN - ADULT  Goal: Verbalizes/displays adequate comfort level or baseline comfort level  Description: Interventions:  - Encourage patient to monitor pain and request assistance  - Assess pain using appropriate pain scale  - Administer analgesics as ordered based on type and severity of pain and evaluate response  - Implement non-pharmacological measures as appropriate and evaluate response  - Consider cultural and social influences on pain and pain management  - Notify physician/advanced practitioner if interventions unsuccessful or patient reports new pain  - Educate patient/family on pain management process including their role and importance of  reporting pain   - Provide non-pharmacologic/complimentary pain relief interventions  Outcome: Progressing     Problem: INFECTION - ADULT  Goal: Absence or prevention of progression during hospitalization  Description: INTERVENTIONS:  - Assess and monitor for signs and symptoms of infection  - Monitor lab/diagnostic results  - Monitor all insertion sites, i.e. indwelling lines, tubes, and drains  - Monitor endotracheal if appropriate and nasal secretions for changes in amount and color  - [place] appropriate cooling/warming therapies per order  - Administer medications as ordered  - Instruct and encourage patient and family to use good hand hygiene technique  - Identify and instruct in appropriate isolation precautions for identified infection/condition  Outcome: Progressing  Goal: Absence of fever/infection during neutropenic period  Description: INTERVENTIONS:  - Monitor WBC  - Perform strict hand hygiene  - Limit to healthy visitors only  - No plants, dried, fresh or silk flowers with yan in patient room  Outcome: Progressing     Problem: SAFETY ADULT  Goal: Patient will remain free of falls  Description: INTERVENTIONS:  - Educate patient/family on patient safety including physical limitations  - Instruct patient to call for assistance with activity   -  Consider consulting OT/PT to assist with strengthening/mobility based on AM PAC & JH-HLM score  - Consult OT/PT to assist with strengthening/mobility   - Keep Call bell within reach  - Keep bed low and locked with side rails adjusted as appropriate  - Keep care items and personal belongings within reach  - Initiate and maintain comfort rounds  - Make Fall Risk Sign visible to staff  - Offer Toileting every  Hours, in advance of need  - Initiate/Maintain alarm  - Obtain necessary fall risk management equipment:   - Apply yellow socks and bracelet for high fall risk patients  - Consider moving patient to room near nurses station  Outcome: Progressing  Goal: Maintain or return to baseline ADL function  Description: INTERVENTIONS:  -  Assess patient's ability to carry out ADLs; assess patient's baseline for ADL function and identify physical deficits which impact ability to perform ADLs (bathing, care of mouth/teeth, toileting, grooming, dressing, etc.)  - Assess/evaluate cause of self-care deficits   - Assess range of motion  - Assess patient's mobility; develop plan if impaired  - Assess patient's need for assistive devices and provide as appropriate  - Encourage maximum independence but intervene and supervise when necessary  - Involve family in performance of ADLs  - Assess for home care needs following discharge   - Consider OT consult to assist with ADL evaluation and planning for discharge  - Provide patient education as appropriate  - Monitor functional capacity and physical performance, use of AM PAC & JH-HLM   - Monitor gait, balance and fatigue with ambulation    Outcome: Progressing  Goal: Maintains/Returns to pre admission functional level  Description: INTERVENTIONS:  - Perform AM-PAC 6 Click Basic Mobility/ Daily Activity assessment daily.  - Set and communicate daily mobility goal to care team and patient/family/caregiver.   - Collaborate with rehabilitation services on mobility goals if consulted  -  Perform Range of Motion  times a day.  - Reposition patient every  hours.  - Dangle patient  times a day  - Stand patient  times a day  - Ambulate patient  times a day  - Out of bed to chair  times a day   - Out of bed for meals  times a day  - Out of bed for toileting  - Record patient progress and toleration of activity level   Outcome: Progressing     Problem: DISCHARGE PLANNING  Goal: Discharge to home or other facility with appropriate resources  Description: INTERVENTIONS:  - Identify barriers to discharge w/patient and caregiver  - Arrange for needed discharge resources and transportation as appropriate  - Identify discharge learning needs (meds, wound care, etc.)  - Arrange for interpretive services to assist at discharge as needed  - Refer to Case Management Department for coordinating discharge planning if the patient needs post-hospital services based on physician/advanced practitioner order or complex needs related to functional status, cognitive ability, or social support system  Outcome: Progressing     Problem: Knowledge Deficit  Goal: Patient/family/caregiver demonstrates understanding of disease process, treatment plan, medications, and discharge instructions  Description: Complete learning assessment and assess knowledge base.  Interventions:  - Provide teaching at level of understanding  - Provide teaching via preferred learning methods  Outcome: Progressing     Problem: POSTPARTUM  Goal: Experiences normal postpartum course  Description: INTERVENTIONS:  - Monitor maternal vital signs  - Assess uterine involution and lochia  Outcome: Progressing  Goal: Appropriate maternal -  bonding  Description: INTERVENTIONS:  - Identify family support  - Assess for appropriate maternal/infant bonding   -Encourage maternal/infant bonding opportunities  - Referral to  or  as needed  Outcome: Progressing  Goal: Establishment of infant feeding pattern  Description: INTERVENTIONS:  -  Assess breast/bottle feeding  - Refer to lactation as needed  Outcome: Progressing  Goal: Incision(s), wounds(s) or drain site(s) healing without S/S of infection  Description: INTERVENTIONS  - Assess and document dressing, incision, wound bed, drain sites and surrounding tissue  - Provide patient and family education  - Perform skin care/dressing changes every  Outcome: Progressing

## 2025-07-18 NOTE — NURSING NOTE
RN reviewed d/c education with pt. Educated parents on safe sleep,  screenings, bath instructions, shaken baby, car seat safety, POST birth warning signs, and /postpartum care. All questions answered, no additional questions at this time. Educational paperwork given.

## 2025-07-18 NOTE — PROGRESS NOTES
"Progress Note - OB/GYN   Name: Ana Luisa Penny 30 y.o. female I MRN: [id number]  Unit/Bed#: -01 I Date of Admission: 2025   Date of Service: 2025 I Hospital Day: 2     Assessment & Plan  Spontaneous vaginal delivery  A/P.  30 y.o.  PPD#2 s/p delivery (Vaginal, Spontaneous) at 39w3d of 3780 g (8 lb 5.3 oz) male , apgars 8 /9 .  (1) PPD#2.  Delivered 2025 11:06 PM.  Doing well.  --> Discharge home.  Rh negative, maternal  Rh negative.  Infant also Rh negative.  --> RhoGAM not indicated.  Postpartum anemia  Postpartum anemia.  Hgb 9.8g/dL.  --> Continue oral iron.    Brent Tovar MD  25  ---------------------------------------------------------------------------------------------    Subjective/    Feels well.  Tolerating po, ambulating, voiding without difficulty.  Happy.  Breastfeeding/bonding.  Mild cramps, appropriate lochia.    Objective/  Blood pressure 100/57, pulse 64, temperature 97.7 °F (36.5 °C), temperature source Oral, resp. rate 17, height 5' 3\" (1.6 m), weight 70.1 kg (154 lb 9.6 oz), last menstrual period 10/08/2024, SpO2 99%, currently breastfeeding.    Patient Vitals for the past 24 hrs:   BP Temp Temp src Pulse Resp SpO2   25 0625 100/57 97.7 °F (36.5 °C) Oral 64 17 99 %   25 2335 115/67 97.8 °F (36.6 °C) Temporal 66 18 98 %   25 1923 119/79 97.5 °F (36.4 °C) Oral 60 17 98 %   25 1500 116/73 98 °F (36.7 °C) Temporal 91 18 98 %   25 1156 121/69 97.7 °F (36.5 °C) Oral 61 18 96 %   25 0754 108/67 97.7 °F (36.5 °C) Oral 69 18 98 %         Physical Exam/      General:  Alert, comfortable, NAD      Cardiovascular: Regular rate and rhythm      Respiratory: Clear to auscultation bilaterally.      Abdomen: Soft, non-tender, non-distended      Fundus: Firm, below umbilicus, nontender      Extremities: Warm, non-tender      Labs/      Blood type:  O-/-      Rubella: Immune      Lab Results   Component Value Date    WBC 14.63 " (H) 07/17/2025    HGB 9.8 (L) 07/17/2025    HCT 30.4 (L) 07/17/2025    MCV 79 (L) 07/17/2025     07/17/2025

## 2025-07-18 NOTE — ASSESSMENT & PLAN NOTE
A/P.  30 y.o.  PPD#2 s/p delivery (Vaginal, Spontaneous) at 39w3d of 3780 g (8 lb 5.3 oz) male , apgars 8 /9 .  (1) PPD#2.  Delivered 2025 11:06 PM.  Doing well.  --> Discharge home.

## 2025-07-21 ENCOUNTER — TELEPHONE (OUTPATIENT)
Age: 30
End: 2025-07-21

## 2025-07-21 NOTE — TELEPHONE ENCOUNTER
Hector from [facility] (la carrier) called to confirm delivery- 25 via , discharged  per record

## 2025-07-21 NOTE — UTILIZATION REVIEW
"Mother and baby discharged 2025    NOTIFICATION OF INPATIENT ADMISSION   MATERNITY/DELIVERY AUTHORIZATION REQUEST   SERVICING FACILITY:   [facility] - L&D, , NICU  [address] [place] [address]  Tax ID: [id number]  NPI: [id number] ATTENDING PROVIDER:  Attending Name and NPI#: Jossy Lopez Md [[id number]]  Address: [address] [facility] [place] [address]  Phone: [phone]     ADMISSION INFORMATION:  Place of Service: Inpatient [facility]  Place of Service Code: 21  Inpatient Admission Date/Time: 25  7:50 AM  Discharge Date/Time: 2025 12:01 PM  Admitting Diagnosis Code/Description:  No admission diagnoses are documented for this encounter.     Mother: Ana Luisa Penny 1995 Estimated Date of Delivery: 25  Delivering clinician: Jossy Lopez   OB History          3    Para   2    Term   2       0    AB   1    Living   2         SAB   1    IAB   0    Ectopic   0    Multiple   0    Live Births   2                Name & MRN:   Information for the patient's :  Callie Penny Boy (Ana Luisa) [[id number]]   [place] Delivery Information:  Sex: male  Delivered 2025 11:06 PM by Vaginal, Spontaneous; Gestational Age: 39w3d     Measurements:  Weight: 8 lb 5.3 oz (3780 g);  Height: 20.5\"    APGAR 1 minute 5 minutes 10 minutes   Totals: 8 9       UTILIZATION REVIEW CONTACT:  Rose Breen Utilization   Network Utilization Review Department  Phone: [phone]  Fax [phone]  Email: [email or web address]  Contact for approvals/pending authorizations, clinical reviews, and discharge.     PHYSICIAN ADVISORY SERVICES:  Medical Necessity Denial & [facility] Review  Phone: [phone]  Fax: [phone]  Email: [email or web address]     DISCHARGE SUPPORT TEAM:  For Patients Discharge Needs & Updates  Phone: [phone] opt. 2 Fax: " [phone]  Email: [email or web address]

## 2025-07-23 ENCOUNTER — TELEPHONE (OUTPATIENT)
Age: 30
End: 2025-07-23

## 2025-07-25 ENCOUNTER — TELEPHONE (OUTPATIENT)
Dept: OBGYN CLINIC | Facility: CLINIC | Age: 30
End: 2025-07-25

## 2025-07-25 NOTE — TELEPHONE ENCOUNTER
Attempted to contact patient after delivery of baby, pt unavailable. Pt delivered 7/16/25. MyChart msg sent 7/18/25.  Pt unavailable.   Left message for pt to reach out via [employer] w/questions/concerns. PPV scheduled 8/21/25 w/Dr Lopez

## 2025-07-26 LAB — PLACENTA IN STORAGE: NORMAL

## 2025-07-28 NOTE — TELEPHONE ENCOUNTER
Attempted to contact patient after delivery of baby, pt unavailable. Pt delivered 7/16/25. MyChart msg sent 7/18/25.  Pt unavailable.   Left message for pt to reach out via [employer] w/questions/concerns. PPV scheduled 8/26/25 w/Dr Lopez

## 2025-08-22 ENCOUNTER — POSTPARTUM VISIT (OUTPATIENT)
Age: 30
End: 2025-08-22

## 2025-08-22 VITALS
SYSTOLIC BLOOD PRESSURE: 110 MMHG | WEIGHT: 141.2 LBS | DIASTOLIC BLOOD PRESSURE: 78 MMHG | BODY MASS INDEX: 25.02 KG/M2 | HEIGHT: 63 IN

## 2025-08-22 DIAGNOSIS — Z00.6 ENCOUNTER FOR EXAMINATION FOR NORMAL COMPARISON OR CONTROL IN CLINICAL RESEARCH PROGRAM: ICD-10-CM

## 2025-08-22 PROCEDURE — 99024 POSTOP FOLLOW-UP VISIT: CPT | Performed by: OBSTETRICS & GYNECOLOGY
